# Patient Record
Sex: FEMALE | Race: ASIAN | NOT HISPANIC OR LATINO | Employment: PART TIME | URBAN - METROPOLITAN AREA
[De-identification: names, ages, dates, MRNs, and addresses within clinical notes are randomized per-mention and may not be internally consistent; named-entity substitution may affect disease eponyms.]

---

## 2017-03-28 ENCOUNTER — OFFICE VISIT - HEALTHEAST (OUTPATIENT)
Dept: FAMILY MEDICINE | Facility: CLINIC | Age: 32
End: 2017-03-28

## 2017-03-28 DIAGNOSIS — J02.9 SORE THROAT: ICD-10-CM

## 2017-03-28 DIAGNOSIS — J02.0 STREP PHARYNGITIS: ICD-10-CM

## 2017-04-21 ENCOUNTER — HOSPITAL ENCOUNTER (OUTPATIENT)
Dept: LAB | Age: 32
Setting detail: SPECIMEN
Discharge: HOME | End: 2017-04-21

## 2017-04-21 ENCOUNTER — OFFICE VISIT - HEALTHEAST (OUTPATIENT)
Dept: FAMILY MEDICINE | Facility: CLINIC | Age: 32
End: 2017-04-21

## 2017-04-21 DIAGNOSIS — Z30.9 CONTRACEPTION MANAGEMENT: ICD-10-CM

## 2017-04-21 DIAGNOSIS — N92.6 ABNORMAL MENSES: ICD-10-CM

## 2017-04-21 DIAGNOSIS — J02.9 SORE THROAT: ICD-10-CM

## 2017-05-15 ENCOUNTER — OFFICE VISIT - HEALTHEAST (OUTPATIENT)
Dept: FAMILY MEDICINE | Facility: CLINIC | Age: 32
End: 2017-05-15

## 2017-05-15 DIAGNOSIS — Z30.9 CONTRACEPTION MANAGEMENT: ICD-10-CM

## 2017-05-18 ENCOUNTER — COMMUNICATION - HEALTHEAST (OUTPATIENT)
Dept: FAMILY MEDICINE | Facility: CLINIC | Age: 32
End: 2017-05-18

## 2017-05-26 ENCOUNTER — RECORDS - HEALTHEAST (OUTPATIENT)
Dept: ADMINISTRATIVE | Facility: OTHER | Age: 32
End: 2017-05-26

## 2017-07-17 ENCOUNTER — OFFICE VISIT - HEALTHEAST (OUTPATIENT)
Dept: FAMILY MEDICINE | Facility: CLINIC | Age: 32
End: 2017-07-17

## 2017-07-17 DIAGNOSIS — Z30.9 CONTRACEPTION MANAGEMENT: ICD-10-CM

## 2017-07-17 DIAGNOSIS — E55.9 VITAMIN D DEFICIENCY: ICD-10-CM

## 2017-07-17 DIAGNOSIS — Z83.2 FAMILY HISTORY OF CLOTTING DISORDER: ICD-10-CM

## 2017-07-17 DIAGNOSIS — D50.8 IRON DEFICIENCY ANEMIA SECONDARY TO INADEQUATE DIETARY IRON INTAKE: ICD-10-CM

## 2017-07-17 ASSESSMENT — MIFFLIN-ST. JEOR: SCORE: 1186.23

## 2017-07-19 LAB — DRVVT, LUPUS ANTICOAGULANT - HISTORICAL: 52 SEC

## 2017-07-21 ENCOUNTER — COMMUNICATION - HEALTHEAST (OUTPATIENT)
Dept: FAMILY MEDICINE | Facility: CLINIC | Age: 32
End: 2017-07-21

## 2017-09-19 ENCOUNTER — OFFICE VISIT - HEALTHEAST (OUTPATIENT)
Dept: FAMILY MEDICINE | Facility: CLINIC | Age: 32
End: 2017-09-19

## 2017-09-19 DIAGNOSIS — R76.0 LUPUS ANTICOAGULANT POSITIVE: ICD-10-CM

## 2017-09-19 DIAGNOSIS — Z30.09 ENCOUNTER FOR COUNSELING REGARDING CONTRACEPTION: ICD-10-CM

## 2017-09-19 ASSESSMENT — MIFFLIN-ST. JEOR: SCORE: 1172.63

## 2017-09-26 ENCOUNTER — OFFICE VISIT - HEALTHEAST (OUTPATIENT)
Dept: FAMILY MEDICINE | Facility: CLINIC | Age: 32
End: 2017-09-26

## 2017-09-26 DIAGNOSIS — Z30.430 ENCOUNTER FOR IUD INSERTION: ICD-10-CM

## 2017-09-26 ASSESSMENT — MIFFLIN-ST. JEOR: SCORE: 1177.16

## 2017-09-28 ENCOUNTER — OFFICE VISIT - HEALTHEAST (OUTPATIENT)
Dept: FAMILY MEDICINE | Facility: CLINIC | Age: 32
End: 2017-09-28

## 2017-09-28 DIAGNOSIS — Z30.9 CONTRACEPTION MANAGEMENT: ICD-10-CM

## 2017-09-28 ASSESSMENT — MIFFLIN-ST. JEOR: SCORE: 1172.63

## 2017-09-29 LAB
HPV INTERPRETATION - HISTORICAL: NORMAL
HPV INTERPRETER - HISTORICAL: NORMAL

## 2017-10-12 ENCOUNTER — RECORDS - HEALTHEAST (OUTPATIENT)
Dept: ADMINISTRATIVE | Facility: OTHER | Age: 32
End: 2017-10-12

## 2017-10-17 ENCOUNTER — AMBULATORY - HEALTHEAST (OUTPATIENT)
Dept: FAMILY MEDICINE | Facility: CLINIC | Age: 32
End: 2017-10-17

## 2017-10-17 ENCOUNTER — AMBULATORY - HEALTHEAST (OUTPATIENT)
Dept: LAB | Facility: CLINIC | Age: 32
End: 2017-10-17

## 2017-10-17 DIAGNOSIS — Z11.1 SCREENING-PULMONARY TB: ICD-10-CM

## 2017-10-19 ENCOUNTER — COMMUNICATION - HEALTHEAST (OUTPATIENT)
Dept: FAMILY MEDICINE | Facility: CLINIC | Age: 32
End: 2017-10-19

## 2017-12-26 ENCOUNTER — COMMUNICATION - HEALTHEAST (OUTPATIENT)
Dept: SCHEDULING | Facility: CLINIC | Age: 32
End: 2017-12-26

## 2017-12-26 ENCOUNTER — OFFICE VISIT - HEALTHEAST (OUTPATIENT)
Dept: FAMILY MEDICINE | Facility: CLINIC | Age: 32
End: 2017-12-26

## 2017-12-26 DIAGNOSIS — D64.9 SEVERE ANEMIA: ICD-10-CM

## 2017-12-26 DIAGNOSIS — R76.0 LUPUS ANTICOAGULANT POSITIVE: ICD-10-CM

## 2017-12-26 DIAGNOSIS — Z92.0 HISTORY OF USE OF CONTRACEPTIVE INTRAUTERINE DEVICE (IUD): ICD-10-CM

## 2017-12-26 DIAGNOSIS — N92.0 MENORRHAGIA WITH REGULAR CYCLE: ICD-10-CM

## 2017-12-27 ENCOUNTER — OFFICE VISIT - HEALTHEAST (OUTPATIENT)
Dept: FAMILY MEDICINE | Facility: CLINIC | Age: 32
End: 2017-12-27

## 2017-12-27 DIAGNOSIS — R76.0 LUPUS ANTICOAGULANT POSITIVE: ICD-10-CM

## 2017-12-27 DIAGNOSIS — D64.9 ANEMIA: ICD-10-CM

## 2017-12-27 DIAGNOSIS — N92.0 MENORRHAGIA WITH REGULAR CYCLE: ICD-10-CM

## 2018-01-22 ENCOUNTER — COMMUNICATION - HEALTHEAST (OUTPATIENT)
Dept: FAMILY MEDICINE | Facility: CLINIC | Age: 33
End: 2018-01-22

## 2018-01-22 ENCOUNTER — OFFICE VISIT - HEALTHEAST (OUTPATIENT)
Dept: FAMILY MEDICINE | Facility: CLINIC | Age: 33
End: 2018-01-22

## 2018-01-22 DIAGNOSIS — N92.0 MENORRHAGIA WITH REGULAR CYCLE: ICD-10-CM

## 2018-01-22 DIAGNOSIS — D50.8 IRON DEFICIENCY ANEMIA SECONDARY TO INADEQUATE DIETARY IRON INTAKE: ICD-10-CM

## 2018-01-22 DIAGNOSIS — R76.0 LUPUS ANTICOAGULANT POSITIVE: ICD-10-CM

## 2018-02-07 ENCOUNTER — RECORDS - HEALTHEAST (OUTPATIENT)
Dept: ADMINISTRATIVE | Facility: OTHER | Age: 33
End: 2018-02-07

## 2018-02-08 ENCOUNTER — RECORDS - HEALTHEAST (OUTPATIENT)
Dept: ADMINISTRATIVE | Facility: OTHER | Age: 33
End: 2018-02-08

## 2018-02-27 ENCOUNTER — OFFICE VISIT - HEALTHEAST (OUTPATIENT)
Dept: FAMILY MEDICINE | Facility: CLINIC | Age: 33
End: 2018-02-27

## 2018-02-27 ENCOUNTER — AMBULATORY - HEALTHEAST (OUTPATIENT)
Dept: NURSING | Facility: CLINIC | Age: 33
End: 2018-02-27

## 2018-02-27 DIAGNOSIS — D50.9 IRON DEFICIENCY ANEMIA: ICD-10-CM

## 2018-02-27 DIAGNOSIS — N92.0 MENORRHAGIA WITH REGULAR CYCLE: ICD-10-CM

## 2018-02-27 DIAGNOSIS — Z30.2 REQUEST FOR STERILIZATION: ICD-10-CM

## 2018-02-27 DIAGNOSIS — R76.0 LUPUS ANTICOAGULANT POSITIVE: ICD-10-CM

## 2018-02-27 DIAGNOSIS — Z01.818 ENCOUNTER FOR PREOPERATIVE EXAMINATION FOR GENERAL SURGICAL PROCEDURE: ICD-10-CM

## 2018-02-27 LAB
ERYTHROCYTE [DISTWIDTH] IN BLOOD BY AUTOMATED COUNT: 12 % (ref 11–14.5)
HCT VFR BLD AUTO: 37.4 % (ref 35–47)
HGB BLD-MCNC: 12.3 G/DL (ref 12–16)
IRON SATN MFR SERPL: 18 % (ref 20–50)
IRON SERPL-MCNC: 71 UG/DL (ref 42–175)
MCH RBC QN AUTO: 28.6 PG (ref 27–34)
MCHC RBC AUTO-ENTMCNC: 32.9 G/DL (ref 32–36)
MCV RBC AUTO: 87 FL (ref 80–100)
PLATELET # BLD AUTO: 290 THOU/UL (ref 140–440)
PMV BLD AUTO: 6.8 FL (ref 7–10)
RBC # BLD AUTO: 4.31 MILL/UL (ref 3.8–5.4)
TIBC SERPL-MCNC: 398 UG/DL (ref 313–563)
TRANSFERRIN SERPL-MCNC: 319 MG/DL (ref 212–360)
WBC: 6.3 THOU/UL (ref 4–11)

## 2018-02-27 ASSESSMENT — MIFFLIN-ST. JEOR: SCORE: 1189.63

## 2018-02-28 ENCOUNTER — COMMUNICATION - HEALTHEAST (OUTPATIENT)
Dept: FAMILY MEDICINE | Facility: CLINIC | Age: 33
End: 2018-02-28

## 2018-03-07 ENCOUNTER — ANESTHESIA - HEALTHEAST (OUTPATIENT)
Dept: SURGERY | Facility: AMBULATORY SURGERY CENTER | Age: 33
End: 2018-03-07

## 2018-03-08 ENCOUNTER — RECORDS - HEALTHEAST (OUTPATIENT)
Dept: ADMINISTRATIVE | Facility: OTHER | Age: 33
End: 2018-03-08

## 2018-03-30 ENCOUNTER — AMBULATORY - HEALTHEAST (OUTPATIENT)
Dept: NURSING | Facility: CLINIC | Age: 33
End: 2018-03-30

## 2018-04-04 ENCOUNTER — PRENATAL OFFICE VISIT - HEALTHEAST (OUTPATIENT)
Dept: FAMILY MEDICINE | Facility: CLINIC | Age: 33
End: 2018-04-04

## 2018-04-04 DIAGNOSIS — Z34.90 NORMAL PREGNANCY: ICD-10-CM

## 2018-04-04 DIAGNOSIS — D50.9 IRON DEFICIENCY ANEMIA: ICD-10-CM

## 2018-04-04 DIAGNOSIS — N92.6 IRREGULAR MENSTRUAL CYCLE: ICD-10-CM

## 2018-04-04 DIAGNOSIS — Z32.01 POSITIVE PREGNANCY TEST: ICD-10-CM

## 2018-04-04 DIAGNOSIS — R76.0 LUPUS ANTICOAGULANT POSITIVE: ICD-10-CM

## 2018-04-04 LAB
ALBUMIN UR-MCNC: ABNORMAL MG/DL
AMPHETAMINES UR QL SCN: NORMAL
BARBITURATES UR QL: NORMAL
BENZODIAZ UR QL: NORMAL
CANNABINOIDS UR QL SCN: NORMAL
COCAINE UR QL: NORMAL
CREAT UR-MCNC: 241.7 MG/DL
GLUCOSE UR STRIP-MCNC: NEGATIVE MG/DL
HCG UR QL: POSITIVE
HIV 1+2 AB+HIV1 P24 AG SERPL QL IA: NEGATIVE
KETONES UR STRIP-MCNC: ABNORMAL MG/DL
OPIATES UR QL SCN: NORMAL
OXYCODONE UR QL: NORMAL
PCP UR QL SCN: NORMAL

## 2018-04-05 LAB
ABO/RH(D): NORMAL
ABORH REPEAT: NORMAL
ANTIBODY SCREEN: NEGATIVE
BACTERIA SPEC CULT: NO GROWTH
COLLECTION METHOD: NORMAL
HBV SURFACE AG SERPL QL IA: NEGATIVE
LEAD BLD-MCNC: NORMAL UG/DL
LEAD RETEST: NO
RUBV IGG SERPL QL IA: NEGATIVE
T PALLIDUM AB SER QL: NEGATIVE

## 2018-04-09 ENCOUNTER — HOSPITAL ENCOUNTER (OUTPATIENT)
Dept: ULTRASOUND IMAGING | Facility: HOSPITAL | Age: 33
Discharge: HOME | End: 2018-04-09
Attending: PHYSICIAN ASSISTANT

## 2018-04-09 DIAGNOSIS — Z34.90 NORMAL PREGNANCY: ICD-10-CM

## 2018-04-09 DIAGNOSIS — Z32.01 POSITIVE PREGNANCY TEST: ICD-10-CM

## 2018-04-26 ENCOUNTER — PRENATAL OFFICE VISIT - HEALTHEAST (OUTPATIENT)
Dept: FAMILY MEDICINE | Facility: CLINIC | Age: 33
End: 2018-04-26

## 2018-04-26 DIAGNOSIS — Z82.49 FAMILY HISTORY OF BLOOD CLOTS: ICD-10-CM

## 2018-04-26 DIAGNOSIS — Z75.8 LANGUAGE BARRIER: ICD-10-CM

## 2018-04-26 DIAGNOSIS — K03.6 BETEL DEPOSIT ON TEETH: ICD-10-CM

## 2018-04-26 DIAGNOSIS — Z60.3 LANGUAGE BARRIER: ICD-10-CM

## 2018-04-26 DIAGNOSIS — R76.0 LUPUS ANTICOAGULANT POSITIVE: ICD-10-CM

## 2018-04-26 DIAGNOSIS — Z34.81 ENCOUNTER FOR SUPERVISION OF OTHER NORMAL PREGNANCY IN FIRST TRIMESTER: ICD-10-CM

## 2018-04-26 LAB
ALBUMIN UR-MCNC: ABNORMAL MG/DL
GLUCOSE UR STRIP-MCNC: NEGATIVE MG/DL
KETONES UR STRIP-MCNC: ABNORMAL MG/DL

## 2018-04-26 SDOH — SOCIAL STABILITY - SOCIAL INSECURITY: ACCULTURATION DIFFICULTY: Z60.3

## 2018-04-27 LAB
C TRACH DNA SPEC QL PROBE+SIG AMP: NEGATIVE
N GONORRHOEA DNA SPEC QL NAA+PROBE: NEGATIVE

## 2018-05-11 ENCOUNTER — COMMUNICATION - HEALTHEAST (OUTPATIENT)
Dept: FAMILY MEDICINE | Facility: CLINIC | Age: 33
End: 2018-05-11

## 2018-05-16 ENCOUNTER — COMMUNICATION - HEALTHEAST (OUTPATIENT)
Dept: FAMILY MEDICINE | Facility: CLINIC | Age: 33
End: 2018-05-16

## 2018-05-21 ENCOUNTER — PRENATAL OFFICE VISIT - HEALTHEAST (OUTPATIENT)
Dept: FAMILY MEDICINE | Facility: CLINIC | Age: 33
End: 2018-05-21

## 2018-05-21 DIAGNOSIS — Z34.82 ENCOUNTER FOR SUPERVISION OF OTHER NORMAL PREGNANCY IN SECOND TRIMESTER: ICD-10-CM

## 2018-05-21 LAB
ALBUMIN UR-MCNC: NEGATIVE MG/DL
GLUCOSE UR STRIP-MCNC: NEGATIVE MG/DL
KETONES UR STRIP-MCNC: NEGATIVE MG/DL

## 2018-06-25 ENCOUNTER — PRENATAL OFFICE VISIT - HEALTHEAST (OUTPATIENT)
Dept: FAMILY MEDICINE | Facility: CLINIC | Age: 33
End: 2018-06-25

## 2018-06-25 DIAGNOSIS — Z34.82 ENCOUNTER FOR SUPERVISION OF OTHER NORMAL PREGNANCY IN SECOND TRIMESTER: ICD-10-CM

## 2018-06-27 LAB
AFP MOM: 1.1 MOM
ALPHA FETO PROTEIN: 63.1 NG/ML
CALCULATED AGE AT EDD: NORMAL
COLLECTION DATE: NORMAL
CURRENT CIGARETTE SMOKING STATUS: NORMAL
DOWN SYNDROME MATERNAL AGE RISK: NORMAL
DOWN SYNDROME SCREEN RISK ESTIMATE: NORMAL
EDD BY U/S SCAN: NORMAL
GA ON COLLECTION BY U/S SCAN: NORMAL WK,D
GA USED IN RISK ESTIMATE: NORMAL
GENERAL TEST INFORMATION: NORMAL
HCG, TOTAL MOM: 0.57 MOM
HCG, TOTAL: 12.1 IU/ML
INHIBIN MOM: 0.62 MOM
INHIBIN: 113 PG/ML
INITIAL OR REPEAT TESTING: NORMAL
INSULIN DIABETIC: NO
INTERPRETATION: NORMAL
IVF PREGNANCY: NO
Lab: NORMAL
NEURAL TUBE DEFECT RISK ESTIMATE: NORMAL
NUMBER OF CHORIONS: NORMAL
NUMBER OF FETUSES:: 1
PATIENT OR FATHER OF BABY HAS A NTD: NO
PATIENT WEIGHT: 139 LBS
PHYSICIAN PHONE NUMBER: NORMAL
PREV DOWN (T21) / TRISOMY PREGNANCY: NORMAL
PREV PREGNANCY W/ NEURAL TUBE DEFECT: NO
PT DATE OF BIRTH: NORMAL
RACE OF MOTHER: NORMAL
RECOMMENDED FOLLOW UP: NORMAL
TRISOMY 18 SCREEN RISK ESTIMATE: NORMAL
UE3 MOM: 1.74 MOM
UE3: 3.02 NG/ML

## 2018-06-29 ENCOUNTER — COMMUNICATION - HEALTHEAST (OUTPATIENT)
Dept: FAMILY MEDICINE | Facility: CLINIC | Age: 33
End: 2018-06-29

## 2018-07-02 ENCOUNTER — HOSPITAL ENCOUNTER (OUTPATIENT)
Dept: ULTRASOUND IMAGING | Facility: HOSPITAL | Age: 33
Discharge: HOME | End: 2018-07-02
Attending: FAMILY MEDICINE

## 2018-07-02 DIAGNOSIS — Z34.82 ENCOUNTER FOR SUPERVISION OF OTHER NORMAL PREGNANCY IN SECOND TRIMESTER: ICD-10-CM

## 2018-07-31 ENCOUNTER — PRENATAL OFFICE VISIT - HEALTHEAST (OUTPATIENT)
Dept: FAMILY MEDICINE | Facility: CLINIC | Age: 33
End: 2018-07-31

## 2018-07-31 DIAGNOSIS — Z34.92 NORMAL PREGNANCY IN SECOND TRIMESTER: ICD-10-CM

## 2018-07-31 DIAGNOSIS — Z34.82 ENCOUNTER FOR SUPERVISION OF OTHER NORMAL PREGNANCY IN SECOND TRIMESTER: ICD-10-CM

## 2018-08-06 ENCOUNTER — PRENATAL OFFICE VISIT - HEALTHEAST (OUTPATIENT)
Dept: FAMILY MEDICINE | Facility: CLINIC | Age: 33
End: 2018-08-06

## 2018-08-06 DIAGNOSIS — Z34.82 ENCOUNTER FOR SUPERVISION OF OTHER NORMAL PREGNANCY IN SECOND TRIMESTER: ICD-10-CM

## 2018-08-06 LAB
ALBUMIN UR-MCNC: NEGATIVE MG/DL
FASTING STATUS PATIENT QL REPORTED: YES
GLUCOSE 1H P 50 G GLC PO SERPL-MCNC: 127 MG/DL (ref 70–139)
GLUCOSE UR STRIP-MCNC: ABNORMAL MG/DL
HGB BLD-MCNC: 10.9 G/DL (ref 12–16)
KETONES UR STRIP-MCNC: NEGATIVE MG/DL

## 2018-08-07 LAB — T PALLIDUM AB SER QL: NEGATIVE

## 2018-08-31 ENCOUNTER — PRENATAL OFFICE VISIT - HEALTHEAST (OUTPATIENT)
Dept: FAMILY MEDICINE | Facility: CLINIC | Age: 33
End: 2018-08-31

## 2018-08-31 DIAGNOSIS — Z34.83 ENCOUNTER FOR SUPERVISION OF OTHER NORMAL PREGNANCY IN THIRD TRIMESTER: ICD-10-CM

## 2018-09-13 ENCOUNTER — PRENATAL OFFICE VISIT - HEALTHEAST (OUTPATIENT)
Dept: FAMILY MEDICINE | Facility: CLINIC | Age: 33
End: 2018-09-13

## 2018-09-13 DIAGNOSIS — Z34.93 NORMAL PREGNANCY IN THIRD TRIMESTER: ICD-10-CM

## 2018-09-13 DIAGNOSIS — Z34.83 ENCOUNTER FOR SUPERVISION OF OTHER NORMAL PREGNANCY IN THIRD TRIMESTER: ICD-10-CM

## 2018-09-24 ENCOUNTER — PRENATAL OFFICE VISIT - HEALTHEAST (OUTPATIENT)
Dept: FAMILY MEDICINE | Facility: CLINIC | Age: 33
End: 2018-09-24

## 2018-09-24 DIAGNOSIS — Z59.9 FINANCIAL DIFFICULTIES: ICD-10-CM

## 2018-09-24 DIAGNOSIS — Z34.93 NORMAL PREGNANCY IN THIRD TRIMESTER: ICD-10-CM

## 2018-09-24 DIAGNOSIS — Z34.83 ENCOUNTER FOR SUPERVISION OF OTHER NORMAL PREGNANCY IN THIRD TRIMESTER: ICD-10-CM

## 2018-09-24 SDOH — ECONOMIC STABILITY - INCOME SECURITY: PROBLEM RELATED TO HOUSING AND ECONOMIC CIRCUMSTANCES, UNSPECIFIED: Z59.9

## 2018-09-28 ENCOUNTER — COMMUNICATION - HEALTHEAST (OUTPATIENT)
Dept: NURSING | Facility: CLINIC | Age: 33
End: 2018-09-28

## 2018-10-08 ENCOUNTER — PRENATAL OFFICE VISIT - HEALTHEAST (OUTPATIENT)
Dept: FAMILY MEDICINE | Facility: CLINIC | Age: 33
End: 2018-10-08

## 2018-10-08 DIAGNOSIS — Z34.83 ENCOUNTER FOR SUPERVISION OF OTHER NORMAL PREGNANCY IN THIRD TRIMESTER: ICD-10-CM

## 2018-10-08 LAB
ALBUMIN UR-MCNC: NEGATIVE MG/DL
GLUCOSE UR STRIP-MCNC: ABNORMAL MG/DL
KETONES UR STRIP-MCNC: NEGATIVE MG/DL

## 2018-10-11 ENCOUNTER — COMMUNICATION - HEALTHEAST (OUTPATIENT)
Dept: NURSING | Facility: CLINIC | Age: 33
End: 2018-10-11

## 2018-10-16 ENCOUNTER — AMBULATORY - HEALTHEAST (OUTPATIENT)
Dept: NURSING | Facility: CLINIC | Age: 33
End: 2018-10-16

## 2018-10-16 ENCOUNTER — COMMUNICATION - HEALTHEAST (OUTPATIENT)
Dept: NURSING | Facility: CLINIC | Age: 33
End: 2018-10-16

## 2018-10-17 ENCOUNTER — COMMUNICATION - HEALTHEAST (OUTPATIENT)
Dept: NURSING | Facility: CLINIC | Age: 33
End: 2018-10-17

## 2018-10-25 ENCOUNTER — PRENATAL OFFICE VISIT - HEALTHEAST (OUTPATIENT)
Dept: FAMILY MEDICINE | Facility: CLINIC | Age: 33
End: 2018-10-25

## 2018-10-25 DIAGNOSIS — Z34.83 ENCOUNTER FOR SUPERVISION OF OTHER NORMAL PREGNANCY IN THIRD TRIMESTER: ICD-10-CM

## 2018-10-25 DIAGNOSIS — Z34.82 ENCOUNTER FOR SUPERVISION OF OTHER NORMAL PREGNANCY IN SECOND TRIMESTER: ICD-10-CM

## 2018-10-26 LAB
ALLERGIC TO PENICILLIN: NO
GP B STREP DNA SPEC QL NAA+PROBE: NEGATIVE

## 2018-10-29 ENCOUNTER — PRENATAL OFFICE VISIT - HEALTHEAST (OUTPATIENT)
Dept: FAMILY MEDICINE | Facility: CLINIC | Age: 33
End: 2018-10-29

## 2018-10-29 DIAGNOSIS — Z34.81 ENCOUNTER FOR SUPERVISION OF OTHER NORMAL PREGNANCY IN FIRST TRIMESTER: ICD-10-CM

## 2018-10-29 DIAGNOSIS — D50.9 IRON DEFICIENCY ANEMIA: ICD-10-CM

## 2018-10-29 DIAGNOSIS — Z34.83 ENCOUNTER FOR SUPERVISION OF OTHER NORMAL PREGNANCY IN THIRD TRIMESTER: ICD-10-CM

## 2018-10-29 DIAGNOSIS — R76.0 LUPUS ANTICOAGULANT POSITIVE: ICD-10-CM

## 2018-10-29 DIAGNOSIS — Z34.82 ENCOUNTER FOR SUPERVISION OF OTHER NORMAL PREGNANCY IN SECOND TRIMESTER: ICD-10-CM

## 2018-10-29 ASSESSMENT — MIFFLIN-ST. JEOR: SCORE: 1302.22

## 2018-11-05 ENCOUNTER — PRENATAL OFFICE VISIT - HEALTHEAST (OUTPATIENT)
Dept: FAMILY MEDICINE | Facility: CLINIC | Age: 33
End: 2018-11-05

## 2018-11-05 DIAGNOSIS — Z34.83 ENCOUNTER FOR SUPERVISION OF OTHER NORMAL PREGNANCY IN THIRD TRIMESTER: ICD-10-CM

## 2018-11-12 ENCOUNTER — PRENATAL OFFICE VISIT - HEALTHEAST (OUTPATIENT)
Dept: FAMILY MEDICINE | Facility: CLINIC | Age: 33
End: 2018-11-12

## 2018-11-12 DIAGNOSIS — Z34.83 ENCOUNTER FOR SUPERVISION OF OTHER NORMAL PREGNANCY IN THIRD TRIMESTER: ICD-10-CM

## 2018-11-16 ENCOUNTER — COMMUNICATION - HEALTHEAST (OUTPATIENT)
Dept: FAMILY MEDICINE | Facility: CLINIC | Age: 33
End: 2018-11-16

## 2018-11-17 ASSESSMENT — MIFFLIN-ST. JEOR: SCORE: 1324.92

## 2018-11-18 ENCOUNTER — ANESTHESIA - HEALTHEAST (OUTPATIENT)
Dept: SURGERY | Facility: HOSPITAL | Age: 33
End: 2018-11-18

## 2018-11-18 ENCOUNTER — SURGERY - HEALTHEAST (OUTPATIENT)
Dept: SURGERY | Facility: HOSPITAL | Age: 33
End: 2018-11-18

## 2018-11-19 ENCOUNTER — HOME CARE/HOSPICE - HEALTHEAST (OUTPATIENT)
Dept: HOME HEALTH SERVICES | Facility: HOME HEALTH | Age: 33
End: 2018-11-19

## 2018-11-20 ENCOUNTER — HOME CARE/HOSPICE - HEALTHEAST (OUTPATIENT)
Dept: HOME HEALTH SERVICES | Facility: HOME HEALTH | Age: 33
End: 2018-11-20

## 2018-11-26 ENCOUNTER — PRENATAL OFFICE VISIT - HEALTHEAST (OUTPATIENT)
Dept: FAMILY MEDICINE | Facility: CLINIC | Age: 33
End: 2018-11-26

## 2018-11-26 DIAGNOSIS — D64.9 ANEMIA, UNSPECIFIED TYPE: ICD-10-CM

## 2018-11-26 DIAGNOSIS — Z28.39 RUBELLA NON-IMMUNE STATUS, ANTEPARTUM: ICD-10-CM

## 2018-11-26 DIAGNOSIS — Z75.8 LANGUAGE BARRIER: ICD-10-CM

## 2018-11-26 DIAGNOSIS — O09.899 RUBELLA NON-IMMUNE STATUS, ANTEPARTUM: ICD-10-CM

## 2018-11-26 DIAGNOSIS — Z60.3 LANGUAGE BARRIER: ICD-10-CM

## 2018-11-26 DIAGNOSIS — Z98.890 POSTOPERATIVE STATE: ICD-10-CM

## 2018-11-26 DIAGNOSIS — E55.9 VITAMIN D DEFICIENCY: ICD-10-CM

## 2018-11-26 SDOH — SOCIAL STABILITY - SOCIAL INSECURITY: ACCULTURATION DIFFICULTY: Z60.3

## 2018-12-20 ENCOUNTER — COMMUNICATION - HEALTHEAST (OUTPATIENT)
Dept: FAMILY MEDICINE | Facility: CLINIC | Age: 33
End: 2018-12-20

## 2018-12-28 ENCOUNTER — COMMUNICATION - HEALTHEAST (OUTPATIENT)
Dept: FAMILY MEDICINE | Facility: CLINIC | Age: 33
End: 2018-12-28

## 2019-01-21 ENCOUNTER — OFFICE VISIT - HEALTHEAST (OUTPATIENT)
Dept: FAMILY MEDICINE | Facility: CLINIC | Age: 34
End: 2019-01-21

## 2019-01-21 DIAGNOSIS — E55.9 VITAMIN D DEFICIENCY: ICD-10-CM

## 2019-01-21 DIAGNOSIS — D50.9 IRON DEFICIENCY ANEMIA: ICD-10-CM

## 2019-01-21 RX ORDER — FERROUS SULFATE 325(65) MG
1 TABLET ORAL
Qty: 100 TABLET | Refills: 1 | Status: SHIPPED | OUTPATIENT
Start: 2019-01-21

## 2019-01-30 ENCOUNTER — COMMUNICATION - HEALTHEAST (OUTPATIENT)
Dept: SCHEDULING | Facility: CLINIC | Age: 34
End: 2019-01-30

## 2019-01-31 ENCOUNTER — OFFICE VISIT - HEALTHEAST (OUTPATIENT)
Dept: FAMILY MEDICINE | Facility: CLINIC | Age: 34
End: 2019-01-31

## 2019-01-31 DIAGNOSIS — L50.9 URTICARIA: ICD-10-CM

## 2019-01-31 RX ORDER — LORATADINE 10 MG/1
10 TABLET ORAL DAILY
Qty: 30 TABLET | Refills: 11 | Status: SHIPPED | OUTPATIENT
Start: 2019-01-31

## 2019-01-31 ASSESSMENT — MIFFLIN-ST. JEOR: SCORE: 1172.63

## 2019-11-12 ENCOUNTER — OFFICE VISIT - HEALTHEAST (OUTPATIENT)
Dept: FAMILY MEDICINE | Facility: CLINIC | Age: 34
End: 2019-11-12

## 2019-11-12 DIAGNOSIS — Z71.84 TRAVEL ADVICE ENCOUNTER: ICD-10-CM

## 2019-11-12 RX ORDER — BISMUTH SUBSALICYLATE 262 MG/1
2 TABLET, CHEWABLE ORAL 4 TIMES DAILY
Qty: 100 TABLET | Refills: 0 | Status: SHIPPED | OUTPATIENT
Start: 2019-11-12 | End: 2024-02-22

## 2019-11-12 RX ORDER — AZITHROMYCIN 500 MG/1
1000 TABLET, FILM COATED ORAL
Qty: 6 TABLET | Refills: 0 | Status: SHIPPED | OUTPATIENT
Start: 2019-11-12 | End: 2024-02-22

## 2019-11-12 ASSESSMENT — MIFFLIN-ST. JEOR: SCORE: 1163.55

## 2020-02-04 ENCOUNTER — OFFICE VISIT - HEALTHEAST (OUTPATIENT)
Dept: FAMILY MEDICINE | Facility: CLINIC | Age: 35
End: 2020-02-04

## 2020-02-04 DIAGNOSIS — K12.2 ORAL ABSCESS: ICD-10-CM

## 2020-02-04 ASSESSMENT — MIFFLIN-ST. JEOR: SCORE: 1131.8

## 2021-05-30 VITALS — BODY MASS INDEX: 25.45 KG/M2 | WEIGHT: 126 LBS

## 2021-05-30 VITALS — WEIGHT: 127.12 LBS | BODY MASS INDEX: 25.68 KG/M2

## 2021-05-30 VITALS — WEIGHT: 126 LBS | BODY MASS INDEX: 25.45 KG/M2

## 2021-05-31 VITALS — BODY MASS INDEX: 25.41 KG/M2 | WEIGHT: 125.8 LBS

## 2021-05-31 VITALS — HEIGHT: 59 IN | BODY MASS INDEX: 25.4 KG/M2 | WEIGHT: 126 LBS

## 2021-05-31 VITALS — BODY MASS INDEX: 25.2 KG/M2 | HEIGHT: 59 IN | WEIGHT: 125 LBS

## 2021-05-31 VITALS — WEIGHT: 125 LBS | BODY MASS INDEX: 25.2 KG/M2 | HEIGHT: 59 IN

## 2021-05-31 VITALS — HEIGHT: 60 IN | BODY MASS INDEX: 24.94 KG/M2 | WEIGHT: 127 LBS

## 2021-05-31 VITALS — BODY MASS INDEX: 25.37 KG/M2 | WEIGHT: 125.6 LBS

## 2021-05-31 VITALS — WEIGHT: 128 LBS | HEIGHT: 59 IN | BODY MASS INDEX: 25.8 KG/M2

## 2021-05-31 VITALS — BODY MASS INDEX: 25.65 KG/M2 | WEIGHT: 127 LBS

## 2021-06-01 VITALS — BODY MASS INDEX: 28.2 KG/M2 | WEIGHT: 142 LBS

## 2021-06-01 VITALS — BODY MASS INDEX: 29.79 KG/M2 | WEIGHT: 150 LBS

## 2021-06-01 VITALS — BODY MASS INDEX: 25.22 KG/M2 | WEIGHT: 127 LBS

## 2021-06-01 VITALS — WEIGHT: 137.25 LBS | BODY MASS INDEX: 27.26 KG/M2

## 2021-06-01 VITALS — BODY MASS INDEX: 28.8 KG/M2 | WEIGHT: 145 LBS

## 2021-06-01 VITALS — BODY MASS INDEX: 26.21 KG/M2 | WEIGHT: 132 LBS

## 2021-06-01 VITALS — WEIGHT: 139 LBS | BODY MASS INDEX: 27.6 KG/M2

## 2021-06-01 VITALS — BODY MASS INDEX: 28.4 KG/M2 | WEIGHT: 143 LBS

## 2021-06-01 VITALS — WEIGHT: 149 LBS | BODY MASS INDEX: 29.59 KG/M2

## 2021-06-02 VITALS — HEIGHT: 59 IN | WEIGHT: 125 LBS | BODY MASS INDEX: 25.2 KG/M2

## 2021-06-02 VITALS — WEIGHT: 127.25 LBS | BODY MASS INDEX: 25.31 KG/M2

## 2021-06-02 VITALS — HEIGHT: 59 IN | WEIGHT: 152 LBS | BODY MASS INDEX: 30.64 KG/M2

## 2021-06-02 VITALS — BODY MASS INDEX: 30.44 KG/M2 | WEIGHT: 153 LBS

## 2021-06-02 VITALS — BODY MASS INDEX: 31.65 KG/M2 | WEIGHT: 157 LBS | HEIGHT: 59 IN

## 2021-06-02 VITALS — WEIGHT: 153 LBS | BODY MASS INDEX: 30.39 KG/M2

## 2021-06-02 VITALS — WEIGHT: 157 LBS | BODY MASS INDEX: 31.23 KG/M2

## 2021-06-03 VITALS
RESPIRATION RATE: 16 BRPM | BODY MASS INDEX: 24.8 KG/M2 | HEART RATE: 88 BPM | WEIGHT: 123 LBS | HEIGHT: 59 IN | DIASTOLIC BLOOD PRESSURE: 68 MMHG | TEMPERATURE: 97.3 F | SYSTOLIC BLOOD PRESSURE: 92 MMHG

## 2021-06-03 NOTE — PROGRESS NOTES
"Office Visit  [facility]  Date of Service: 11/12/2019      Subjective   Hla Jeanette Roman is a 34 y.o. female who presents for Travel Consult    [facility] [place]-1/15/20  Whole family.  Visiting dad's mom.  Will be in city of [facility], in [facility].    Patient has history of malaria disease.  Motivated to avoid recurrent malaria.    Objective   BP 92/68 (Patient Site: Right Arm, Patient Position: Sitting, Cuff Size: Adult Regular)   Pulse 88   Temp 97.3  F (36.3  C) (Oral)   Resp 16   Ht 4' 11\" (1.499 m)   Wt 123 lb (55.8 kg)   LMP 01/28/2018   BMI 24.84 kg/m     She reports that she has never smoked. She has never used smokeless tobacco.    Gen: Alert, no apparent distress.  Heart: Regular rate and rhythm, no murmurs.  Lungs: Clear to auscultation bilaterally, no increased work of breathing.  Abdomen: Soft, non-tender, non-distended, bowel sounds normal.  Extremities: No clubbing, cyanosis, edema.    Assessment & Plan     1. Travel advice encounter.  Planning travel to [facility].  Malaria prophylaxis given.  Discussed mosquito avoidance.  Traveler's diarrhea antibiotic: Azithromycin.  Discussed avoiding water, peeling, cooking foods.  Discussed traveler safety including seatbelts and motor vehicle safety.  Immunizations reviewed and updated with influenza and typhoid immunization.      Order Summary                                                      1. Travel advice encounter  mefloquine (LARIAM) 250 mg tablet    azithromycin (ZITHROMAX) 500 MG tablet    bismuth subsalicylate (BISMUTH SUBSALICYLATE) 262 mg Chew chew tab    Typhoid, Inactive, Inj      No future appointments.    Completed by: Eleanor Steinberg M.D., [facility]. 11/12/2019 4:42 PM.  This transcription uses voice recognition software, which may contain typographical errors.  "

## 2021-06-04 VITALS
HEART RATE: 91 BPM | RESPIRATION RATE: 18 BRPM | DIASTOLIC BLOOD PRESSURE: 68 MMHG | SYSTOLIC BLOOD PRESSURE: 100 MMHG | WEIGHT: 116 LBS | HEIGHT: 59 IN | TEMPERATURE: 98.5 F | BODY MASS INDEX: 23.39 KG/M2

## 2021-06-05 NOTE — PROGRESS NOTES
"Office Visit  [facility]  Date of Service: 02/04/2020      Subjective   Abhinav Roman is a 34 y.o. female who presents for pain on mouth    Abhinav Roman presents with a painful bump on the roof of her mouth.  It is been present for 5 days.  It has been growing in size and increasing in pain.  She has never had pain like this before.  No fevers.  No other symptoms elsewhere.  No sinus pain.  She feels like the pain is radiating into her tooth on the upper left anterior side.    Objective   /68 (Patient Site: Left Arm, Patient Position: Sitting, Cuff Size: Adult Regular)   Pulse 91   Temp 98.5  F (36.9  C) (Oral)   Resp 18   Ht 4' 11\" (1.499 m)   Wt 116 lb (52.6 kg)   LMP 01/28/2018   BMI 23.43 kg/m     She reports that she has never smoked. She has never used smokeless tobacco.    Gen: Alert, no apparent distress.  Mouth: Poor dentition with caries and cracked teeth.  On the anterior left hard palate, there is a 1.5 cm soft, skin-colored bump, which is tender.  No tenderness of the sinuses.  No visible erythema around the bases of the teeth.      Assessment & Plan     1. Oral abscess.  Treat with Augmentin 3 times daily, referral to ENT to discuss whether it needs draining.  Discussed with patient that if symptoms are getting worse, or failing to improve with that within 48 to 72 hours, she needs to be seen more urgently.    Order Summary                                                      1. Oral abscess  amoxicillin-clavulanate (AUGMENTIN) 500-125 mg per tablet    Ambulatory referral to ENT      Future Appointments   Date Time Provider Department Center   2/14/2020  8:30 AM Aniket Plasencia MD [facility]       Completed by: Eleanor Steinberg M.D., [facility]. 2/4/2020 2:52 PM.  This transcription uses voice recognition software, which may contain typographical errors.  "

## 2021-06-09 NOTE — PROGRESS NOTES
"Subjective:  31 y.o. female here with concerns of sore throat.  Has had fever, cough, and ST for three days.  Son sick as well.  Has headache and myaglia.  Has not taken any medicine.  No rash.    Objective:  /60 (Patient Site: Right Arm, Patient Position: Sitting, Cuff Size: Adult Regular)  Pulse (!) 113  Temp 99.2  F (37.3  C) (Oral)   Wt 126 lb (57.2 kg)  LMP 03/12/2017 (Exact Date)  SpO2 98%  Breastfeeding? No  BMI 25.45 kg/m2     GENERAL: alert, not distressed  EARS: tympanic membranes normal, external auditory canals normal  NOSE: no rhinorrhea, nasal mucosa normal  PHARYNX: has erythema and exudates on both tonsils.  MOUTH: well hydrated. Handling secretions well.  No \"hot potato\" voice.  FRONTAL and MAXILLARY SINUSES: non tender  NECK: mild lymphadenopathy, no nodules, or rigidity.  CHEST: clear, no rales, rhonchi, or wheezes  CARDIAC: regular without murmur    Assessment and Plan:  Strep pharyngitis  Diagnosis discussed and treatment options presented.  Patient desired IM PCN.  Will recommend NSAID for pain or fever.  Since son is sick with similar illness, he should be seen as well.  - penicillin G benzathine injection 1.2 Million Units (BICILLIN-LA); Inject 2 mL (1.2 Million Units total) into the shoulder, thigh, or buttocks once.  - ibuprofen (ADVIL,MOTRIN) 400 MG tablet; Take 1-2 tablets (400-800 mg total) by mouth every 6 (six) hours as needed for pain.  Dispense: 30 tablet; Refill: 1       "

## 2021-06-10 NOTE — PROGRESS NOTES
"  Subjective:    Abhinav Reid is a 32 y.o. female who presents for contraception management.  Most recently, I saw patient and we discussed NuvaRing, that was over one year ago.  Previously she had mentioned that IUD caused too much bleeding, Depo-Provera shot made her dizzy and gave her too much intermittent bleeding.  She did not want to try Nexplanon.  We discussed patches and NuvaRing.  At that visit, she wanted to try NuvaRing.  Today, she says that she did not like it.  She says NuvaRing caused irregular bleeding and too much \"fluid leaking.\"  She is interested in trying Ortho Evra patch.  She has never used it before.    Patient Active Problem List   Diagnosis     Vitamin D Deficiency     Seborrhea Capitis     Lightheadedness     Anemia       Current Outpatient Prescriptions:      ferrous sulfate 325 mg (65 mg iron) CpER, Take 1 tablet by mouth 3 (three) times a day., Disp: 100 capsule, Rfl: 2     norelgestromin-ethinyl estradiol (ORTHO EVRA) 150-35 mcg/24 hr, Use one patch on skin once a week for 3 weeks.  Then no patch for one week.  Then repeat., Disp: 9 patch, Rfl: 3    Objective:   Allergies:  Review of patient's allergies indicates no known allergies.    Vitals:    05/15/17 1110   BP: 112/70   Pulse: 76     Body mass index is 25.68 kg/(m^2).    General: Alert and oriented x 3, in no apparent distress       Assessment and Plan:   1.  Contraception management.  After discussion, patient would like Ortho Evra patch.  No history of high blood pressure, she is a non-smoker, no history of blood clots.  Risks, benefits, possible side effects of medication were discussed with patient.  We reviewed proper use.  She knows she needs to use backup birth control for the first 2 weeks of use.  We reviewed proper start time.  I asked her to follow-up with us if she would like to try something different.    This dictation uses voice recognition software, which may contain typographical errors.    "

## 2021-06-11 NOTE — PROGRESS NOTES
" Subjective    Abhinav Reid is a 32 y.o. female who presents for contraception counseling.    Depo-Provera caused excessive bleeding, and iron deficiency anemia.  ParaGard IUD caused excessive bleeding.  NuvaRing caused irregular bleeding.    She likes the birth control patch.  Discussed details of how to use it.  She acted a little bit surprised about this.    She has a history of using iron for iron deficiency anemia.  She is not currently on iron.  She has a history of vitamin D deficiency and is not currently on vitamin D.    Chart reviewed.  Her son, Tenzin, has a history of lupus anticoagulant positive.  The patient has never been tested for this.  Recommended testing for this thrombophilia because its presence would be a contraindication to hormonal contraception.     Objective    Blood pressure 90/70, pulse 84, resp. rate 18, height 4' 11\" (1.499 m), weight 128 lb (58.1 kg), last menstrual period 07/01/2017, not currently breastfeeding. Body mass index is 25.85 kg/(m^2). Patient reports that she has never smoked. She has never used smokeless tobacco.    Gen: Alert, no apparent distress.  Heart: Regular rate and rhythm, no murmurs.  Lungs: Clear to auscultation bilaterally, no increased work of breathing.  Abdomen: Soft, non-tender, non-distended, bowel sounds normal.  Extremities: No clubbing, cyanosis, edema.    Results for orders placed or performed in visit on 07/17/17   HM2(CBC w/o Differential)   Result Value Ref Range    WBC 5.5 4.0 - 11.0 thou/uL    RBC 4.30 3.80 - 5.40 mill/uL    Hemoglobin 12.0 12.0 - 16.0 g/dL    Hematocrit 35.9 35.0 - 47.0 %    MCV 83 80 - 100 fL    MCH 27.9 27.0 - 34.0 pg    MCHC 33.4 32.0 - 36.0 g/dL    RDW 11.8 11.0 - 14.5 %    Platelets 272 140 - 440 thou/uL    MPV 7.4 7.0 - 10.0 fL     No results found.       Assessment & Plan      Abhinav is a 32 y.o. female who is here today for Follow-up (birth control)      1. Conception counseling.  Ortho Evra refilled.  Due to son's history of " lupus anticoagulant, recommended screening for lupus coagulant.  This was done today.  Result is pending.  2. History of iron deficiency anemia.  CBC and iron studies done.  3. History of vitamin D deficiency.  Recommended ongoing vitamin D supplementation.  Currently not on a supplement.  Check baseline levels.    1. Contraception management - patch  norelgestromin-ethinyl estradiol (ORTHO EVRA) 150-35 mcg/24 hr   2. Iron deficiency anemia secondary to inadequate dietary iron intake  HM2(CBC w/o Differential)    Iron and Transferrin Iron Binding Capacity   3. Vitamin D deficiency  Vitamin D, Total (25-Hydroxy)   4. Family history of clotting disorder  Lupus Anticoagulant           This transcription uses voice recognition software, which may contain typographical errors.

## 2021-06-13 NOTE — PROGRESS NOTES
" Subjective    Abhinav Reid is a 32 y.o. female who presents for IUD insertion.    The patient was previously using oral contraceptive pills or birth control patch for contraception.  However, due to her child's history of lupus anticoagulant, she was tested for lupus anticoagulant and was found to be positive for it.  Estrogen-containing contraception contraindicated due to this finding.  Previously met with patient to discuss risks, benefits and alternatives.  She has elected to proceed with IUD.       Objective    Blood pressure 100/74, pulse 84, resp. rate 17, height 4' 11\" (1.499 m), weight 126 lb (57.2 kg), last menstrual period 09/14/2017, not currently breastfeeding. Body mass index is 25.45 kg/(m^2). Patient reports that she has never smoked. She has never used smokeless tobacco.    Gen: Alert, no apparent distress.  Heart: Regular rate and rhythm, no murmurs.  Lungs: Clear to auscultation bilaterally, no increased work of breathing.  Abdomen: Soft, non-tender, non-distended, bowel sounds normal.  Extremities: No clubbing, cyanosis, edema.    Results for orders placed or performed in visit on 09/26/17   Pregnancy (Beta-hCG, Qual), Urine   Result Value Ref Range    Pregnancy Test, Urine Negative Negative    Specific Gravity, UA 1.025 1.001 - 1.030     No results found.       Assessment & Plan      Abhinav is a 32 y.o. female who is here today for Contraception (insertion)      1. Attempted IUD insertion, unable to pass uterine sound beyond internal cause.  The patient will return to clinic in 2 days.  She will premedicate with misoprostol 400 mcg the night report for the procedure, and 400 mcg 2 hours prior to appointment time.    1. Encounter for IUD insertion  Pregnancy (Beta-hCG, Qual), Urine    ibuprofen tablet 800 mg (ADVIL,MOTRIN)    miSOPROStol (CYTOTEC) 200 MCG tablet    Chlamydia trachomatis & Neisseria gonorrhoeae, Amplified Detection    Gynecologic Cytology (PAP Smear)           This transcription uses " voice recognition software, which may contain typographical errors.

## 2021-06-13 NOTE — PROGRESS NOTES
Insertion of IUD  Date/Time: 2017 9:15 AM  Performed by: MARILIN SHRESTHA  Authorized by: MARILIN SHRESTHA   Consent: Verbal consent obtained.  Risks and benefits: risks, benefits and alternatives were discussed  Consent given by: patient  Comments: IUD Insertion Procedure Note  PREPROCEDURE    Copper allergy? No  Iodine Allergy? No  [place] monogamy? Yes  Hx of PID/STD? None  Patient's last menstrual period was 2017., ; last pap   Current birth control: Ortho Evra Patch.    Patient Counseled Regarding:  Mechanism of action  Effectiveness rates and duration  Complications  Insertion and removal procedures  Discussed warning signs for IUD complications  Instructed to check stings monthly, after menses    Verbal consent obtained.    Patient pre-medicated with Ibuprofen 800mg po x1.    UPT Obtained, result:  Recent Results (from the past 24 hour(s))  -Pregnancy (Beta-hCG, Qual), Urine       Result                                            Value                         Ref Range                       Pregnancy Test, Urine                             Negative                      Negative                        Specific [place], UA                              1.025                         1.001 - 1.030                PROCEDURE  Bimanual exam performed and cervix inspected. Uterus normal size and position. Gonorrhea and Chlamydia swab obtained. Cervix cleansed with betadine x3. Tenaculum applied on anterior lip of cervix. Uterine sound passed to internal os, then unable to pass further. Gentle, firm pressure applied, without success. Due to inability to safely pass uterine sound, the procedure was aborted. Patient tolerated procedure well. Patient will return for IUD insertion in two days after premedication with oral misoprostol.    ASSESSMENT  Attempted Insertion of Mirena IUD.    PLAN  Return for IUD insertion after premedication with misoprostol.  Take 400 mcg night before procedure and 400 mcg  two hours before procedure.

## 2021-06-13 NOTE — PROGRESS NOTES
" Subjective    Abhinav Reid is a 32 y.o. female who presents for IUD insertion.    2 days ago, the patient is here for an IUD insertion.  I was unable to pass the uterine sound beyond the cervical OS.  The procedure was aborted and the patient was instructed to take 400 mcg of Cytotec at bedtime and 400 mcg 2 hours before the procedure.  She returns today for IUD placement       Objective    Blood pressure 92/68, pulse 76, resp. rate 18, height 4' 11\" (1.499 m), weight 125 lb (56.7 kg), last menstrual period 09/14/2017, not currently breastfeeding. Body mass index is 25.25 kg/(m^2). Patient reports that she has never smoked. She has never used smokeless tobacco.    Gen: Alert, no apparent distress.  Heart: Regular rate and rhythm, no murmurs.  Lungs: Clear to auscultation bilaterally, no increased work of breathing.  Abdomen: Soft, non-tender, non-distended, bowel sounds normal.  Extremities: No clubbing, cyanosis, edema.    Results for orders placed or performed in visit on 09/28/17   Pregnancy, Urine   Result Value Ref Range    Pregnancy Test, Urine Negative Negative    Specific Gravity, UA 1.025 1.001 - 1.030     No results found.       Assessment & Plan      Abhinav is a 32 y.o. female who is here today for Contraception      1. Second failed attempt at IUD placement.  Premedication with Cytotec did not accomplish successful insertion.  Referral made to [facility] for IUD insertion.  Please see documentation from procedure notes on 9/26/17 and 9/28/17.    1. Contraception management  Pregnancy, Urine    Ambulatory referral to Obstetrics / Gynecology           This transcription uses voice recognition software, which may contain typographical errors.  "

## 2021-06-13 NOTE — PROGRESS NOTES
Insertion of IUD  Date/Time: 2017 4:42 PM  Performed by: MARILIN SHRESTHA  Authorized by: MARILIN SHRESTHA         IUD Insertion Procedure Note  PREPROCEDURE      Copper allergy? No    Iodine Allergy? No    [place] monogamy? Yes    Hx of PID/STD? no    Patient's last menstrual period was 2017., ; last pap 17 - result pending.    Current birth control: None.    Patient Counseled Regarding:    Mechanism of action    Effectiveness rates and duration    Complications    Insertion and removal procedures    Discussed warning signs for IUD complications    Instructed to check stings monthly, after menses    Instructed IUD must not be in place longer than 5 years     Verbal consent obtained.    Patient pre-medicated with Ibuprofen 800mg po x1.    UPT Obtained, result:  Recent Results (from the past 24 hour(s))   Pregnancy, Urine   Result Value Ref Range    Pregnancy Test, Urine Negative Negative    Specific Gravity, UA 1.025 1.001 - 1.030       PROCEDURE  Bimanual exam performed and cervix inspected. Uterus normal size and position. Gonorrhea and Chlamydia swab obtained. Cervix cleansed with betadine x3. Tenaculum applied on anterior lip of cervix. Uterus sound passed to 3 cm and hit resistance. Gentle tension applied on tenaculum, and gentle pressure on cervix, resulted in no advancement of uterine sound.    ASSESSMENT  Attempted isertion of Mirena IUD, UNSUCCESSFUL.     PLAN  Referral to OB/Gyn

## 2021-06-13 NOTE — PROGRESS NOTES
" Chery Reid is a 32 y.o. female who presents for discussion of clotting disorder.    The patient is here today to discuss her newly diagnosed clotting disorder and contraception.    The patient has a son who has lupus anticoagulant and had a clot.  The patient was tested for lupus anticoagulant because of her son's history and was found to have a positive lupus anticoagulant test.  She has never had a problem with clots.  She has not had problems with recurrent pregnancy loss.  As far as she knows, no one else in her family, except for her son have had problems with clots.  No family history of recurrent pregnancy loss.    The patient has been using birth control patch.  When her lupus anticoagulant test came back positive, she was advised to discontinue this and come in to discuss birth control options.  She did not really like the birth control pills or patch because it made her feel depressed.  She has previously used a ParaGard IUD, but it made her bleed too much.  She thinks that she has done with her family, but might consider having one more child.  She would really like to have a daughter.  Not interested in permanent contraception until she is sure that she is done with her family.       Objective    Blood pressure 94/72, pulse 77, temperature 97.2  F (36.2  C), temperature source Oral, resp. rate 17, height 4' 11\" (1.499 m), weight 125 lb (56.7 kg), last menstrual period 09/14/2017, not currently breastfeeding. Body mass index is 25.25 kg/(m^2). Patient reports that she has never smoked. She has never used smokeless tobacco.    Gen: Alert, no apparent distress.  Heart: Regular rate and rhythm, no murmurs.  Lungs: Clear to auscultation bilaterally, no increased work of breathing.  Abdomen: Soft, non-tender, non-distended, bowel sounds normal.  Extremities: No clubbing, cyanosis, edema.    Results for orders placed or performed in visit on 07/17/17   Lupus Anticoagulant   Result Value Ref Range    " "Lupus Anticoagulant Screen 52 (H) <=47 sec   HM2(CBC w/o Differential)   Result Value Ref Range    WBC 5.5 4.0 - 11.0 thou/uL    RBC 4.30 3.80 - 5.40 mill/uL    Hemoglobin 12.0 12.0 - 16.0 g/dL    Hematocrit 35.9 35.0 - 47.0 %    MCV 83 80 - 100 fL    MCH 27.9 27.0 - 34.0 pg    MCHC 33.4 32.0 - 36.0 g/dL    RDW 11.8 11.0 - 14.5 %    Platelets 272 140 - 440 thou/uL    MPV 7.4 7.0 - 10.0 fL   Iron and Transferrin Iron Binding Capacity   Result Value Ref Range    Iron 26 (L) 42 - 175 ug/dL    Transferrin 317 212 - 360 mg/dL    Transferrin Saturation, Calculated 7 (L) 20 - 50 %    Transferrin IBC, Calculated 396 313 - 563 ug/dL   Vitamin D, Total (25-Hydroxy)   Result Value Ref Range    Vitamin D, Total (25-Hydroxy) 26.1 (L) 30.0 - 80.0 ng/mL   Lupus Anticoagulant Confirmation   Result Value Ref Range    Lupus Anti Coag Confirmation 40 <=40 seconds    LA Screen / LA Confirm Ratio 1.3 (H) <=1.2   APTT Inhibitor Screen   Result Value Ref Range    PTT 43 (H) 24 - 37 seconds    APTT Normal Plasma 28 24 - 37 seconds    APTT Correct(1:1Mix) 33 24 - 37 seconds   STA Clot LA Assay   Result Value Ref Range    STA Clot Diluted 95.6 (H) 0 - 63 sec    STA Clot Hexagonal 65.8 (H) 0 - 58 sec    STA Clot Difference 29.8 (H) <=11.1 sec     No results found.       Assessment & Plan      Abhinav is a 32 y.o. female who is here today for Follow-up (blood clots)      1. Lupus anticoagulant positive without antiphospholipid antibody syndrome (no history of clot).  Recommend avoidance of estrogens.  Discontinued birth control patch.  Estrogens added to \"allergy\" list.  Discussed risks of clots with prolonged immobility, surgery, injuries.  Due to no history of clot, anticoagulation is not advised at this time.  2. Contraception counseling in patients with clotting disorder.  Discussed options including permanent contraception (patient not interested, considering more children), ParaGard IUD (caused excessive bleeding), Mirena IUD (not super " excited about medicine on the IUD), Depo-Provera/Nexplanon (small increased risk of clotting), condoms/withdrawal (not as effective).  Per discussion of each of the options, the patient elects to proceed with Mirena IUD insertion.  Scheduled this for next week.  She just completed her menstrual period.  She plans to abstain from intercourse until IUD placement.  3. [place] language.   used.       Total time: 25 minutes. Greater than 50% spent in counseling and coordination of care regarding: new diagnosis of thrombophilia and contraception counseling.     1. Lupus anticoagulant positive     2. Encounter for counseling regarding contraception             This transcription uses voice recognition software, which may contain typographical errors.

## 2021-06-15 NOTE — PROGRESS NOTES
ASSESSMENT/PLAN:  1. Anemia  Hemoglobin    Ambulatory referral to Gynecology   2. Menorrhagia with regular cycle  Ambulatory referral to Gynecology   3. Lupus anticoagulant positive         This is a 31 yo female with:  1.  Anemia - patient here yesterday with hemoglobin of 7.0.  Today's hemoglobin is 7.1.  This is due to her heavy menstrual bleeding (including extrusion of Mirena IUD (that had been placed just 6 weeks earlier)).  She is still bleeding.  Symptomatic with lightheadedness - BP 90/52, pulse normal.  Patient appears pale but otherwise stable.  Given the underlying positive lupus anticoagulant, I cannot give her estrogen/hormones to stop her bleeding.  She states that she is not interested in future pregnancies.  May be a good candidate for surgical options (D&C, ablation?).  Will refer to gynecology.  2.  Menorrhagia - as above, will refer  3.  Lupus anticoagulant positivity - limits options for treatment of bleeding.          There are no discontinued medications.  There are no Patient Instructions on file for this visit.    Chief Complaint:  Chief Complaint   Patient presents with     Follow-up     hemoglobin       HPI:   Abhinav Reid is a 32 y.o. female c/o  Still feels lightheaded  Still having menstrual bleeding -   No further big clots, but still fairly heavy  Not interested in having more pregnancies  But also not interested in an IUD    PMH:   Patient Active Problem List    Diagnosis Date Noted     History of use of contraceptive intrauterine device (IUD) 12/27/2017     Menorrhagia with regular cycle 12/26/2017     Lupus anticoagulant positive 07/20/2017     Language barrier 07/17/2017     Financial difficulties 07/17/2017     Iron deficiency anemia secondary to inadequate dietary iron intake      Vitamin D deficiency      Past Medical History:   Diagnosis Date     Iron deficiency anemia      Past Surgical History:   Procedure Laterality Date     NO PAST SURGERIES       Social History     Social  History     Marital status:      Spouse name: Haider Roman     Number of children: 2     Years of education: High school     Occupational History     Not on file.     Social History Main Topics     Smoking status: Never Smoker     Smokeless tobacco: Never Used     Alcohol use Not on file     Drug use: Not on file      Comment: Betel Nut Use     Sexual activity: Not on file     Other Topics Concern     Not on file     Social History Narrative       Meds:    Current Outpatient Prescriptions:      ferrous sulfate 325 (65 FE) MG tablet, Take 1 tablet (325 mg total) by mouth 2 (two) times a day. Take with orange juice., Disp: 60 tablet, Rfl: 1    Current Facility-Administered Medications:      ibuprofen tablet 800 mg (ADVIL,MOTRIN), 800 mg, Oral, Once, Eleanor Steinberg MD    Allergies:  Allergies   Allergen Reactions     Estrogens Other (See Comments)     Lupus anticoagulant positive       ROS:  Pertinent positives as noted in HPI; otherwise 12 point ROS negative.      Physical Exam:  EXAM:  BP 90/52 (Patient Site: Left Arm, Patient Position: Sitting, Cuff Size: Adult Regular)  Pulse 91  Temp 98.1  F (36.7  C) (Oral)   Resp 14  Wt 125 lb 12.8 oz (57.1 kg)  SpO2 99%  BMI 25.41 kg/m2   Gen:  NAD, appears well, well-hydrated  HEENT:  TMs nl, oropharynx benign, nasal mucosa nl, conjunctiva clear  Neck:  Supple, no adenopathy, no thyromegaly, no carotid bruits, no JVD  Lungs:  Clear to auscultation bilaterally  Cor:  RRR no murmur  Abd:  Soft, nontender, BS+, no masses, no guarding or rebound, no HSM  Extr:  Neg.  Neuro:  No asymmetry  Skin:  Warm/dry, pale        Results:  Results for orders placed or performed in visit on 12/27/17   Hemoglobin   Result Value Ref Range    Hemoglobin 7.1 (LL) 12.0 - 16.0 g/dL

## 2021-06-15 NOTE — PROGRESS NOTES
Subjective    Abhinav Reid is a 32 y.o. female who presents for follow-up of recent ER visit.    [facility] 12/27/2017[facility] was feeling dizzy and lightheaded was having heavy menstrual bleeding.  There, her hemoglobin was found to be 7.2.  This is actually stable from her clinic visit.  They they transfused 2 units of packed red blood cells and discharge her to home.  An ultrasound at that time showed blood in the uterus.  Since her transfusion, she is feeling well.  No further heavy menstrual bleeding.    The patient and her  are sure that they are done having children.  They are planning to do a vasectomy for contraception.  However she still needs treatment of her menorrhagia.  Hormonal birth control is contraindicated due to her testing which is positive for lupus anticoagulant syndrome.  She was tested for lupus anticoagulant syndrome because of a son who has lupus anticoagulant and had a blood clot.  The patient herself has not experienced blood clots.     Objective    Blood pressure 102/64, pulse 92, weight 127 lb (57.6 kg), SpO2 99 %, not currently breastfeeding. Body mass index is 25.65 kg/(m^2). Patient reports that she has never smoked. She has never used smokeless tobacco.    Gen: Alert, no apparent distress.  Heart: Regular rate and rhythm, no murmurs.  Lungs: Clear to auscultation bilaterally, no increased work of breathing.  Abdomen: Soft, non-tender, non-distended, bowel sounds normal.  Extremities: No clubbing, cyanosis, edema.    Results for orders placed or performed in visit on 12/27/17   Hemoglobin   Result Value Ref Range    Hemoglobin 7.1 (LL) 12.0 - 16.0 g/dL     No results found.       Assessment & Plan      Abhinav is a 32 y.o. female who is here today for Follow-up (ER - [facility])      1. Acute blood loss anemia.  Bleeding is stopped.  Status post 2 units of packed red blood cells.  No symptoms of anemia now.  2. Menorrhagia.  Referred to  OB/GYN to discuss endometrial ablation.  Her  is planning to have a vasectomy for contraception.  She has had a Mirena IUD, however it took 3 attempts to place it, and then she spontaneously expelled it.  She has no interest in IUD.  She does not want to have more children.    1. Lupus anticoagulant positive  Ambulatory referral to Obstetrics / Gynecology   2. Iron deficiency anemia secondary to inadequate dietary iron intake  Ambulatory referral to Obstetrics / Gynecology   3. Menorrhagia with regular cycle  Ambulatory referral to Obstetrics / Gynecology       No future appointments.     This transcription uses voice recognition software, which may contain typographical errors.

## 2021-06-15 NOTE — PROGRESS NOTES
"ASSESSMENT/PLAN:  1. Severe anemia     2. Menorrhagia with regular cycle  Hemoglobin   3. History of use of contraceptive intrauterine device (IUD)     4. Lupus anticoagulant positive         This is a 31 yo female with menorrhagia - comes in with heavy bleeding and reports that her IUD \"fell out\" on 12/22/17.  Had called earlier to report that her IUD felt \"unusual\"/malpositioned.  By the time she got to the appointment, reports that she saw the IUD with a large clot.  Did not retrieve the IUD for verification.  However, exam suggests that there are no visible strings and no at least superficial evidence for an IUD at this time.  She continues to have heavy menstrual bleeding and reports lightheadedness with this.  Has h/o severe anemia in past (?related to pregnancy vs. Menstrual bleeding).  A hemoglobin was checked and is 7.3 (reported by lab) and will be sent out to reference lab.  She will come back to see me tomorrow a.m. For recheck.  The patient has a h/o positive lupus anticoagulant so hormonal stoppage of bleeding is difficult.  May need surgical intervention.  Again, I'll see her back tomorrow and decide if she needs more urgent intervention.  Will start Ferrous sulfate 325 mg po BID as well.  Disucssed warning signs/sx that should prompt ER evaluation before tomorrow morning.  Patient notes good understanding.         Medications Discontinued During This Encounter   Medication Reason     norelgestromin-ethinyl estradiol (ORTHO EVRA) 150-35 mcg/24 hr Therapy completed     miSOPROStol (CYTOTEC) 200 MCG tablet Therapy completed     ferrous sulfate 325 mg (65 mg iron) CpER Therapy completed     There are no Patient Instructions on file for this visit.    Chief Complaint:  Chief Complaint   Patient presents with     other     check IUD       HPI:   Abhinav Reid is a 32 y.o. female c/o  Had IUD insertion in September -   Saw IUD pass last week - had lots of bleeding  Still having a lot of bleeding - getting better " -   Not necessarily usual to have this kind of bleeding   Does NOT want another IUD    PMH:   Patient Active Problem List    Diagnosis Date Noted     History of use of contraceptive intrauterine device (IUD) 12/27/2017     Menorrhagia with regular cycle 12/26/2017     Lupus anticoagulant positive 07/20/2017     Language barrier 07/17/2017     Financial difficulties 07/17/2017     Iron deficiency anemia secondary to inadequate dietary iron intake      Vitamin D deficiency      Past Medical History:   Diagnosis Date     Iron deficiency anemia      Past Surgical History:   Procedure Laterality Date     NO PAST SURGERIES       Social History     Social History     Marital status:      Spouse name: Haider Roman     Number of children: 2     Years of education: High school     Occupational History     Not on file.     Social History Main Topics     Smoking status: Never Smoker     Smokeless tobacco: Never Used     Alcohol use Not on file     Drug use: Not on file      Comment: Betel Nut Use     Sexual activity: Not on file     Other Topics Concern     Not on file     Social History Narrative       Meds:    Current Outpatient Prescriptions:      ferrous sulfate 325 (65 FE) MG tablet, Take 1 tablet (325 mg total) by mouth 2 (two) times a day. Take with orange juice., Disp: 60 tablet, Rfl: 1    Current Facility-Administered Medications:      ibuprofen tablet 800 mg (ADVIL,MOTRIN), 800 mg, Oral, Once, Eleanor Steinberg MD    Allergies:  Allergies   Allergen Reactions     Estrogens Other (See Comments)     Lupus anticoagulant positive       ROS:  Pertinent positives as noted in HPI; otherwise 12 point ROS negative.      Physical Exam:  EXAM:  BP 96/60 (Patient Site: Left Arm, Patient Position: Sitting, Cuff Size: Adult Regular)  Pulse (!) 104  Temp 98.3  F (36.8  C) (Oral)   Resp 14  Wt 125 lb 9.6 oz (57 kg)  SpO2 99%  BMI 25.37 kg/m2   Gen:  NAD, appears well, well-hydrated  HEENT:  TMs nl, oropharynx benign, nasal  mucosa nl, conjunctiva clear  Neck:  Supple, no adenopathy, no thyromegaly, no carotid bruits, no JVD  Lungs:  Clear to auscultation bilaterally  Cor:  RRR no murmur  Abd:  Soft, nontender, BS+, no masses, no guarding or rebound, no HSM  PELVIC EXAM:External genitalia: normal  Vaginal mucosa normal  Vaginal discharge: large amounts of blood  Speculum exam shows a normal appearing cervix - os slightly open, no IUD strings noted and no IUD with probing at os; 2 large clots extruded from oss .   Bimanual exam: non tender  to motion.  Uterus  firm, regular, mobile, non tender to palpation. No adnexal masses or tenderness.   Extr:  Neg.  Neuro:  No asymmetry  Skin:  Warm/dry, pale        Results:  Results for orders placed or performed in visit on 12/26/17   Hemoglobin   Result Value Ref Range    Hemoglobin 7.0 (L) 12.0 - 16.0 g/dL

## 2021-06-16 PROBLEM — Z60.3 LANGUAGE BARRIER: Status: ACTIVE | Noted: 2017-07-17

## 2021-06-16 PROBLEM — Z59.9 FINANCIAL DIFFICULTIES: Status: ACTIVE | Noted: 2017-07-17

## 2021-06-16 PROBLEM — Z75.8 LANGUAGE BARRIER: Status: ACTIVE | Noted: 2017-07-17

## 2021-06-16 NOTE — PROGRESS NOTES
Abhinav was an add on today. She was in to see Dr. Steinberg for a pre-op exam. She received a closing notice for her PMAP with care ending 2/28/2018, her surgery is 3/9/2018. WINNIE contacted [facility] and was on a 40 minute hold time before a worker took the call. Abhinav said that she completed the renewal sent it in(forgot to sign it, huge denial issue) [facility] sent her another one to sign and she signed and returned aka mailed  it on 2/7/2018. [facility] worker stated that their scanning system has been down for 3 to 4 weeks, so her mail is setting in a que waiting to be scanned in. The closing notice she received was auto generated by the system because her proof was not yet scanned in the system, so it is showing as not received. WINNIE said that she needed to have her coverage active and that they needed to expedite her case information. They did say that IF it was received and scanned in at a later date that her coverage would be effective 3/1/2018 without a gap in coverage. Worker was going to note that her renewal needed to be expedited. WINNIE will call on Monday to see if that happened.

## 2021-06-16 NOTE — PROGRESS NOTES
Preoperative Exam    Scheduled Procedure: Laparoscopic Tubal Ligation, Hysteroscopy D&C, and Endometrial Ablation  Surgery Date:  3/9/2018  Surgery Location: [facility], fax [phone]    Surgeon:  Hanane Key [facility]/G[facility]    Assessment/Plan:     Problem List Items Addressed This Visit        Medium    Iron deficiency anemia    Relevant Medications    ferrous sulfate 325 (65 FE) MG tablet    Other Relevant Orders    HM2(CBC w/o Differential) (Completed)    Iron and Transferrin Iron Binding Capacity    Lupus anticoagulant positive    Menorrhagia with regular cycle - Primary      Other Visit Diagnoses     Request for sterilization            Surgical Procedure Risk: Low (reported cardiac risk generally < 1%)  Have you had prior anesthesia?: No  Have you or any family members had a previous anesthesia reaction:  No  Do you or any family members have a history of a clotting or bleeding disorder?: Yes: Self and Son  Cardiac Risk Assessment: no increased risk for major cardiac complications    Patient approved for surgery with general or local anesthesia.    Please Note:  The patient has a clotting disorder.  She is at increased risk for blood clots.  The patient will need a pregnancy test at the time of surgery.    Functional Status: Independent  Patient plans to recover at home with family.     Subjective:     CC:   Abhinav Reid is a 32 y.o. female who presents for a preoperative consultation.      HPI:  The patient is a  who has a expressed interest in permanent birth control and treatment of heavy menstrual bleeding.  Patient was previously on a hormone containing form of birth control.  However, her son was found to have lupus anticoagulant disorder due to a blood clot.  The patient was tested and found to have lupus anticoagulant syndrome.  Patient does not have a history of blood clots.  Due to lupus anticoagulant syndrome, estrogen-containing birth control was contraindicated.  Abhinav Reid  also has menorrhagia.  In December 2017, her hemoglobin had fallen to 7.0 due to her menorrhagia, and she received 2 units of packed red blood cells at [facility] ER.  She was discharged home on oral iron, which she tolerated well.  She stopped taking that when she started to feel better.  She has previously tried an IUD for control of menorrhagia.  It took 3 attempts to insert it.  Then it self-expelled.  She is no longer interested in IUD.  She is done having children.  She understands that having her tubes tied is permanent and not reversible.  She is currently using abstinence for contraception.     ROS:  All other systems reviewed and are negative, other than those listed in the HPI.    Pertinent History  Do you have difficulty breathing or chest pain after walking up a flight of stairs: No  History of obstructive sleep apnea: No  Steroid use in the last 6 months: No  Frequent Aspirin/NSAID use: No  Prior Blood Transfusion: Yes: in December 2017  Prior Blood Transfusion Reaction: No  If for some reason prior to, during or after the procedure, if it is medically indicated, would you be willing to have a blood transfusion?:  There is no transfusion refusal.    Current Outpatient Prescriptions   Medication Sig Dispense Refill     ferrous sulfate 325 (65 FE) MG tablet Take 1 tablet (325 mg total) by mouth daily with breakfast. Take with orange juice. 100 tablet 1     Current Facility-Administered Medications   Medication Dose Route Frequency Provider Last Rate Last Dose     ibuprofen tablet 800 mg (ADVIL,MOTRIN)  800 mg Oral Once Eleanor Steinberg MD            Allergies   Allergen Reactions     Estrogens Other (See Comments)     Lupus anticoagulant positive       Patient Active Problem List   Diagnosis     Vitamin D deficiency     Iron deficiency anemia     Language barrier     Financial difficulties     Lupus anticoagulant positive     Menorrhagia with regular cycle       Past Medical History:   Diagnosis  "Date     Medical history non-contributory        Past Surgical History:   Procedure Laterality Date     NO PAST SURGERIES         Social History     Social History     Marital status:      Spouse name: Haider Roman     Number of children: 2     Years of education: High school     Occupational History     Mother      P[employer]      4 hours per day     Social History Main Topics     Smoking status: Never Smoker     Smokeless tobacco: Never Used     Alcohol use No     Drug use: No      Comment: Betel Nut Use     Sexual activity: Yes     Partners: Male     Birth control/ protection: None     Other Topics Concern     Not on file     Social History Narrative     No narrative on file       Patient Care Team:  Eleanor Steinberg MD as PCP - General          Objective:     Vitals:    02/27/18 0820   BP: 90/62   Pulse: 78   Resp: 12   Temp: 97  F (36.1  C)   TempSrc: Oral   SpO2: 99%   Weight: 127 lb (57.6 kg)   Height: 4' 11.5\" (1.511 m)   LMP: 01/28/2018         Physical Exam:  PHYSICAL EXAM:   BP 90/62 (Patient Site: Left Arm, Patient Position: Sitting, Cuff Size: Adult Regular)  Pulse 78  Temp 97  F (36.1  C) (Oral)   Resp 12  Ht 4' 11.5\" (1.511 m)  Wt 127 lb (57.6 kg)  LMP 01/28/2018  SpO2 99%  BMI 25.22 kg/m2   History   Smoking Status     Never Smoker   Smokeless Tobacco     Never Used     GENERAL: Alert, NAD.  HEAD: NC/AT.  EARS: Normal canal, pinnae and tympanic membrane.  EYES: PERRL, normal fundi.  NOSE: Normal mucosa.  MOUTH: Adequate dentition, normal throat.  NECK: normal thyroid, midline trachea.  BREASTS: no masses, skin changes, nipple discharge or tenderness.  LUNGS: CTA bilaterally, no increased work of breathing.  HEART: RRR, no m/r/g  ABDOMEN: soft, nt/nd, BS+  : Not examined  MSK: No significant deformity.  SKIN: no atypical lesion or rash.  LYMPHATICS: no lymphadenopathy.   PSYCH: normal affect.    Labs:  Recent Results (from the past 24 hour(s))   HM2(CBC w/o Differential)    Collection Time: " 02/27/18  9:37 AM   Result Value Ref Range    WBC 6.3 4.0 - 11.0 thou/uL    RBC 4.31 3.80 - 5.40 mill/uL    Hemoglobin 12.3 12.0 - 16.0 g/dL    Hematocrit 37.4 35.0 - 47.0 %    MCV 87 80 - 100 fL    MCH 28.6 27.0 - 34.0 pg    MCHC 32.9 32.0 - 36.0 g/dL    RDW 12.0 11.0 - 14.5 %    Platelets 290 140 - 440 thou/uL    MPV 6.8 (L) 7.0 - 10.0 fL        Ref. Range 7/17/2017 10:01 12/26/2017 14:05 12/27/2017 09:52 2/27/2018 09:37   Hemoglobin Latest Ref Range: 12.0 - 16.0 g/dL 12.0 7.0 (L) 7.1 (LL) 12.3       Immunization History   Administered Date(s) Administered     HPV Quadrivalent 01/07/2011, 06/22/2011, 03/06/2012     Hep A, historic 12/08/2010, 06/22/2011     Hep B, historic 12/08/2010, 01/07/2011, 06/22/2011     Influenza, Seasonal, Inj PF IIV3 09/23/2010     Influenza, inj, historic,unspecified 10/01/2012     Influenza, seasonal,quad inj 36+ mos 09/21/2015, 09/19/2017     Influenza, seasonal,quad inj 6-35 mos 11/17/2011, 09/19/2013, 11/20/2014     Influenza,seasonal, Inj IIV3 09/27/2012     MMR 01/01/2011, 06/22/2011, 10/19/2013     Rubella: Immune 11/29/2013     Tdap 01/07/2011, 08/09/2013     Varicella 01/07/2011, 06/22/2011           Electronically signed by Eleanor Steinberg MD 02/27/18 8:22 AM

## 2021-06-17 NOTE — PROGRESS NOTES
Chief Complaint:  Chief Complaint   Patient presents with     Pregnancy Confirmation     #3     HPI:   Abhinav Roman is a 32 y.o. female is here for pregnancy intake.  She is .  Patient's last menstrual period was 2018 (exact date).  She was planning on having a tubal ligation.  Surgery was scheduled for 3/9/2018.  Before surgery she lost her insurance, and surgery was canceled.  She would like to have her tubes tied after this pregnancy.  Positive home pregnancy test yesterday.  She has lupus.  She is taking an iron supplement.  She is having some nausea and vomiting.  She has been taking an herbal medicine from the market for nausea.    Past medical history: reviewed and updated.  Past Medical History:   Diagnosis Date     History of transfusion      Medical history non-contributory      Past Surgical History:   Procedure Laterality Date     NO PAST SURGERIES         Current Outpatient Prescriptions:      ferrous sulfate 325 (65 FE) MG tablet, Take 1 tablet (325 mg total) by mouth daily with breakfast. Take with orange juice., Disp: 100 tablet, Rfl: 1     prenatal vit-iron fum-folic ac (PRENATAL VITAMIN) 27 mg iron- 0.8 mg Tab, Take 1 tablet by mouth once daily., Disp: 100 tablet, Rfl: 3     pyridoxine, vitamin B6, (VITAMIN B-6) 25 MG tablet, Take 1 tablet by mouth 3 times a day, as needed for nausea., Disp: 60 tablet, Rfl: 0  No current facility-administered medications for this visit.     Social:  Social History   Substance Use Topics     Smoking status: Never Smoker     Smokeless tobacco: Never Used     Alcohol use No       OBJECTIVE:  Allergies   Allergen Reactions     Estrogens Other (See Comments)     Lupus anticoagulant positive     Vitals:    18 1522   BP: 122/60   Pulse: 84   Resp: 20     Body mass index is 25.22 kg/(m^2).    Vital signs stable as recorded above  General: Patient is alert and oriented x 3, in no apparent distress  Fetal Exam: Fundal height was not palpable, fetal heart  tones are not heard.    Results:  Results for orders placed or performed in visit on 18   Pregnancy, Urine   Result Value Ref Range    Pregnancy Test, Urine Positive (!) Negative   Urinalysis, OB Screen   Result Value Ref Range    Glucose, UA Negative Negative    Ketones, UA 15 mg/dL (!) Negative    Protein, UA Trace (!) Negative mg/dL     Other screening pregnancy lab work is ordered and pending.    Patient scored a 0/30 on [place]  Depression Screen.    Assessment and Plan:  1. Pregnancy Intake Appointment.  Abhinav Roman is 32 y.o. and .  Patient's last menstrual period was 2018 (exact date).  Estimated Date of Delivery: 10/31/18  She will be seeing Dr. Steinberg for OB care.  Screening pregnancy lab work was drawn.  Prenatal vitamins prescribed.  Problem list and current medications reviewed and updated.  Dating ultrasound ordered.  Prenatal info packet given.  First trimester genetic screening was discussed with patient.  She declines at this time.  I recommended she stop taking the herbal nausea medicine she got from the market.    2.  Lupus.  No bothersome symptoms presently.  Continue to monitor, I will review with her OB doctor.    Follow up in 2-4 weeks.  Please see OB Episode for complete details.  Visit was approximately 40 minutes, greater than 50% of time spent in face-to-face counseling and coordination of care.    This dictation uses voice recognition software, which may contain typographical errors.

## 2021-06-17 NOTE — PROGRESS NOTES
Abhinav came in to see WINNIE. She is pregnant again, but has not had her first OB. SW had called Helen about her case as her children were approved for BC/BS MA and her medical did not start again until 4/1/18. Apparently she and her  were approved for [employer] and have a monthly premium. WINNIE reported the pregnancy, so she will have regular MA coverage back to 12/27/2017, so any outstanding bills will be covered. She will come back to see WINNIE in 2 months. Guesstimated due date by Helen was 11/1/2018.

## 2021-06-17 NOTE — PATIENT INSTRUCTIONS - HE
Patient Instructions by Eleanor Steibnerg MD at 1/31/2019 10:00 AM     Author: Eleanor Steinberg MD Service: -- Author Type: Physician    Filed: 1/31/2019 10:26 AM Encounter Date: 1/31/2019 Status: Addendum    : Eleanor Steinberg MD (Physician)    Related Notes: Original Note by Eleanor Steinberg MD (Physician) filed at 1/31/2019 10:25 AM         Patient Education     Hives (Adult)  Hives are pink or red bumps on the skin. These bumps are also known as wheals. The bumps can itch, burn, or sting. Hives can occur anywhere on the body. They vary in size and shape and can form in clusters. Individual hives can appear and go away quickly. New hives may develop as old ones fade. Hives are common and usually harmless. Occasionally hives are a sign of a serious allergy.  Hives are often caused by an allergic reaction. It may be an allergic reaction to foods such as fruit, shellfish, chocolate, nuts, or tomatoes. It may be a reaction to pollens, animal fur, or mold spores. Medicines, chemicals, and insect bites can also cause hives. And hives can be caused by hot sun or cold air. The cause of hives can be difficult to find.  You may be given medicines to relieve swelling and itching. Follow all instructions when using these medicines. The hives will usually fade in a few days, but can last up to 2 weeks.  Home care  Follow these tips:    Dont scratch the hives. Scratching will delay healing. To reduce itching, apply cool, wet compresses to the skin.    Dress in soft, loose cotton clothing.    Dont bathe in hot water. This can make the itching worse.    Apply an ice pack or cool pack wrapped in a thin towel to your skin. This will help reduce redness and itching. But if your hives were caused by exposure to cold, then do not apply more cold to them.

## 2021-06-17 NOTE — PROGRESS NOTES
First OB Appointment    Assessment & Plan:  1. Lupus anticoagulant positive. Son had history of clot in infancy (cavernous sinus thrombosis). The patient has NOT had clot, pre-eclampsia, or  delivery. Not a candidate for LMWH/heparin. Discussed risks/benefits of aspirin. Rx aspirin 81 mg daily. She does not have a diagnosis of SLE.  2. Unplanned pregnancy. Planning PPTL.  3. Betel nut use. Advised cessation.  4. Discussed dating. Final EDC based on 8w2d US, not consistent with LMP.  5. Genetic screening. Discussed options. Patient would like quad screen and anatomic survey. She declines first trimester screening/[facility].  6. Language barrier. Elizabeth  present.    Subjective:  This is a unplanned pregnancy. The patient feels OK about it. Current pregnancy symptoms include: mild nausea.     Reviewed history of Lupus anticoagulant positive. Son had history of clot in infancy (cavernous sinus thrombosis). The patient has NOT had clot, pre-eclampsia, or  delivery. Reviewed with the patient that she does NOT have Lupus.     She is using betel nut. It helps with excess salivation.    I reviewed the following components of the patient chart today:    OB intake note    Active problems    Past Medical History    Medications    Allergies    Family History    Social History    Genetic Questionairre    Prenatal Labs    Prenatal Ultrasound    OB history  OB History    Para Term  AB Living   3 2 2   2   SAB TAB Ectopic Multiple Live Births       2      # Outcome Date GA Lbr Osman/2nd Weight Sex Delivery Anes PTL Lv   3 Current            2 Term 10/18/13 40w0d  7 lb 3 oz (3.26 kg) M Vag-Spont  N MINE      Birth Comments: iron deficiency anemia. Betel nut use. . No lacerations.  ml   1 Term 12/31/10 40w3d  6 lb 14 oz (3.118 kg) M Vag-Spont IV N MINE      Birth Comments: Rubella and Varicella non-immune. Poverty. IV fentanyl, AROM, 2nd degree + right labial lac.  mL. Postpartum  endometritis.          Objective:  See prenatal flowsheet and physical exam portion of prenatal episode.    Total time 40 minutes, >50% spent in counseling and coordination of care regarding new pregnancy and review of patient's current health status and pregnancy.

## 2021-06-18 NOTE — PROGRESS NOTES
Subjective:  Taking aspirin.  No problems.    OB ROS:   Headache: None.   Vision change: None.   Abnormal vaginal discharge: None.   Bleeding: None.   Fetal movement: None.     Objective:  Reviewed vitals. Exam - see flowsheet.   Recent Results (from the past 24 hour(s))   Urinalysis, OB Screen (ketones, glucose, protein)   Result Value Ref Range    Glucose, UA Negative Negative    Ketones, UA Negative Negative    Protein, UA Negative Negative mg/dL        Assessment/Plan:    33 y.o.  at 14w2d.  1. Encounter for supervision of other normal pregnancy in second trimester  Urinalysis, OB Screen (ketones, glucose, protein)    prenatal vit-iron fum-folic ac (PRENATAL VITAMIN) 27 mg iron- 0.8 mg Tab      Total time: 15 minutes. Greater than 50% spent in counseling and coordination of care regarding topics, above.

## 2021-06-18 NOTE — PROGRESS NOTES
Subjective:  No problems. Feeling movement now.  Wants quad screen + anatomy US. Hoping for girl.    OB ROS:   Headache: None.   Vision change: None.   Abnormal vaginal discharge: None.   Bleeding: None.   Fetal movement: Normal.     Objective:  Reviewed vitals. Exam - see flowsheet.   Recent Results (from the past 24 hour(s))   Urinalysis, OB Screen (ketones, glucose, protein)   Result Value Ref Range    Glucose, UA Negative Negative    Ketones, UA Negative Negative    Protein, UA Negative Negative mg/dL        Assessment/Plan:    33 y.o.  at 19w2d.    Quad screen + anatomic survey scheduled.  1. Encounter for supervision of other normal pregnancy in second trimester  Urinalysis, OB Screen (ketones, glucose, protein)    US OB > = 14 Weeks    Quad Screen (Second Trimester) Maternal      Total time: 15 minutes. Greater than 50% spent in counseling and coordination of care regarding topics, above.

## 2021-06-19 NOTE — PROGRESS NOTES
Subjective:  No problems.    OB ROS:   Headache: None.   Vision change: None.   Abnormal vaginal discharge: None.   Bleeding: None.   Fetal movement: Normal.     Objective:  Reviewed vitals. Exam - see flowsheet.   Recent Results (from the past 24 hour(s))   Urinalysis, OB Screen (ketones, glucose, protein)   Result Value Ref Range    Glucose, UA Negative Negative    Ketones, UA Negative Negative    Protein, UA Negative Negative mg/dL        Assessment/Plan:    33 y.o.  at 24w3d.    1hr GCT at next visit.  1. Encounter for supervision of other normal pregnancy in second trimester  Urinalysis, OB Screen (ketones, glucose, protein)    RPR    Hemoglobin    Glucose,Gestational Challenge (1 Hour)   2. Normal pregnancy in second trimester        Total time: 15 minutes. Greater than 50% spent in counseling and coordination of care regarding topics, above.

## 2021-06-19 NOTE — PROGRESS NOTES
Subjective:  No problems. Here for 1hr GCT.    The patient is interested in a postpartum tubal ligation.  We have discussed this previously.  She is sure that she wants to be done having children.  She knows that it is permanent and irreversible.  We discussed the procedure and how it is done.  Discussed when it is done.  She would like to sign the consent form.    OB ROS:   Headache: None.   Vision change: None.   Abnormal vaginal discharge: None.   Bleeding: None.   Fetal movement: Normal.     Objective:  Reviewed vitals. Exam - see flowsheet.   Recent Results (from the past 24 hour(s))   Urinalysis, OB Screen (ketones, glucose, protein)   Result Value Ref Range    Glucose,  mg/dL (!) Negative    Ketones, UA Negative Negative    Protein, UA Negative Negative mg/dL        Assessment/Plan:    33 y.o.  at 25w2d.    One hour glucose tolerance test, hemoglobin, and syphilis screening done.    Postpartum tubal ligation consent form signed after discussion of risks, benefits, and alternatives.   1. Encounter for supervision of other normal pregnancy in second trimester  Urinalysis, OB Screen (ketones, glucose, protein)    RPR    Hemoglobin    Glucose,Gestational Challenge (1 Hour)      Total time: 15 minutes. Greater than 50% spent in counseling and coordination of care regarding topics, above.  A professional Elizabeth  was used for this visit.

## 2021-06-20 NOTE — PROGRESS NOTES
Subjective:  Advance directive given and completed today.    OB ROS:   Headache: None.   Vision change: None.   Abnormal vaginal discharge: None.   Bleeding: None.   Fetal movement: Normal.     Objective:  Reviewed vitals. Exam - see flowsheet.   Recent Results (from the past 24 hour(s))   Urinalysis, OB Screen (ketones, glucose, protein)   Result Value Ref Range    Glucose, UA Negative Negative    Ketones, UA Negative Negative    Protein, UA Negative Negative mg/dL        Assessment/Plan:    33 y.o.  at 28w6d.    Tdap given.  1. Encounter for supervision of other normal pregnancy in third trimester  Urinalysis, OB Screen (ketones, glucose, protein)    Tdap vaccine greater than or equal to 6yo IM      Total time: 15 minutes. Greater than 50% spent in counseling and coordination of care regarding topics, above.

## 2021-06-20 NOTE — PROGRESS NOTES
ID #: [id number] Agency: [employer] [facility]    Care guide talked to the patient about [facility] and she agreed to enroll. RN Assessment was scheduled for 10/16/18 at 10:00 am.     Upcoming Appointments:  10/16/18 at 10:00 with [facility] RN    Next Outreach: 10/16/18

## 2021-06-20 NOTE — PROGRESS NOTES
Subjective:  No problems. Contractions are occasional.    OB ROS:   Headache: None.   Vision change: None.   Abnormal vaginal discharge: None.   Bleeding: None.   Fetal movement: Normal.     Objective:  Reviewed vitals. Exam - see flowsheet.   Recent Results (from the past 24 hour(s))   Urinalysis, OB Screen (ketones, glucose, protein)   Result Value Ref Range    Glucose, UA Negative Negative    Ketones, UA Negative Negative    Protein, UA Negative Negative mg/dL        Assessment/Plan:    33 y.o.  at 30w5d.    Patient will get flu shot with her kids at next visit.  1. Encounter for supervision of other normal pregnancy in third trimester  Urinalysis, OB Screen (ketones, glucose, protein)    Influenza, Seasonal, Inj      Total time: 15 minutes. Greater than 50% spent in counseling and coordination of care regarding topics, above.

## 2021-06-20 NOTE — PROGRESS NOTES
: Wade ID: [id number] Agency: [employer] [facility]    Attempt 1:  Care guide left a message for the patient about [facility] enrollment.  If the patient is trying to call the Care guide back transfer them to Manpreet Laurent at [phone].    Next Outreach: 10/2/18

## 2021-06-20 NOTE — PROGRESS NOTES
Subjective:  Still needs car seat and crib    OB ROS:   Headache: None.   Vision change: None.   Abnormal vaginal discharge: None.   Bleeding: None.   Fetal movement: Normal.     Objective:  Reviewed vitals. Exam - see flowsheet.   Recent Results (from the past 24 hour(s))   Urinalysis, OB Screen (ketones, glucose, protein)   Result Value Ref Range    Glucose,  mg/dL (!) Negative    Ketones, UA Negative Negative    Protein, UA Negative Negative mg/dL        Assessment/Plan:    33 y.o.  at 34w2d.    Glucosuria - recommend reduced carbs in diet    Financial needs - meet with SW  1. Encounter for supervision of other normal pregnancy in third trimester  Urinalysis, OB Screen (ketones, glucose, protein)      Total time: 15 minutes. Greater than 50% spent in counseling and coordination of care regarding topics, above.

## 2021-06-21 NOTE — PROGRESS NOTES
Subjective:  No problems    OB ROS:   Headache: None.   Vision change: None.   Abnormal vaginal discharge: None.   Bleeding: None.   Fetal movement: Normal.     Objective:  Reviewed vitals. Exam - see flowsheet.   Recent Results (from the past 24 hour(s))   Urinalysis, OB Screen (ketones, glucose, protein)   Result Value Ref Range    Glucose, UA Negative Negative    Ketones, UA Negative Negative    Protein, UA Negative Negative mg/dL        Assessment/Plan:    33 y.o.  at 36w5d.    GBS obtained  1. Encounter for supervision of other normal pregnancy in third trimester  Urinalysis, OB Screen (ketones, glucose, protein)    Group B Strep Screen by PCR   2. Encounter for supervision of other normal pregnancy in second trimester  prenatal vit-iron fum-folic ac (PRENATAL VITAMIN) 27 mg iron- 0.8 mg Tab      Total time: 15 minutes. Greater than 50% spent in counseling and coordination of care regarding topics, above.

## 2021-06-21 NOTE — ANESTHESIA POSTPROCEDURE EVALUATION
Patient: Abhinav Reid Jessie  LIGATION, FALLOPIAN TUBE, POSTPARTUM  Anesthesia type: general    Patient location: PACU  Last vitals:   Vitals:    11/18/18 0950   BP: 110/73   Pulse: 84   Resp: 16   Temp:    SpO2: 97%     Post vital signs: stable  Level of consciousness: awake and responds to simple questions  Post-anesthesia pain: pain controlled  Post-anesthesia nausea and vomiting: no  Pulmonary: room air  Cardiovascular: stable and blood pressure at baseline  Hydration: adequate  Anesthetic events: no    QCDR Measures:  ASA# 11 - Dannielle-op Cardiac Arrest: ASA11B - Patient did NOT experience unanticipated cardiac arrest  ASA# 12 - Dannielle-op Mortality Rate: ASA12B - Patient did NOT die  ASA# 13 - PACU Re-Intubation Rate: ASA13B - Patient did NOT require a new airway mgmt  ASA# 10 - Composite Anes Safety: ASA10A - No serious adverse event    Additional Notes:

## 2021-06-21 NOTE — PROGRESS NOTES
"33yr F PMH:  Vitamin D deficiancy, language barrier, lupus anticoagulant deficiancy, rubella non-immune status, betel nut use.  34 wks pregnant with 3rd child.  Previous 2 pregnancy's \"normal\" per patient report.  Currently lives in public housing with spouse and 7 & 5 yr old sons, 2 bedroom home. Mother, Father & Sister live in same complex for support system.   drives for grocery shopping, dr's appt's, rx .  Denies pain.  Patient able to read \"some English\" to take her medications/vitamins out of the bottles on a daily basis.  Has a car seat prepared for the baby.  Patient stating \"I need a crib\" and 'Insurance' for the baby.  Writer is aware that [facility] no longer is able to assist with cribs.  Will have [facility] SW give [facility] CG the crib resources.  Insurance for the baby is not established until the baby is born.  This is completed by the SW at the hospital.  Instructed the patient if for some reason she has any difficulties with this process to please notify [facility] CG at her very first post partum appointment and [facility] would assist with this process.  However patient not enrolled at this time.       RN Recommendations and Referrals  Patient not enrolled into [facility].   Will get crib resources for patient.  [place] Sure or Insurance will be taken care of when baby is born in the hospital by SW.    Action Plan    RN Will  Will not add the patient to [facility] tracking list  Be available to the patient as nursing needs arise   Will inquire from [facility] SW the potential/possible crib resources [facility] uses at this time for patients in need of cribs.  Care Guide Will  Mail out crib resources to the patient.   Will f/u with patient after [facility] SW responds on crib resources [facility] uses at this times.    Goals    Clinic Care Coordination RN Assessed Needs  Patient Centered Assessment Method-PCAM TOTAL SCORE: 15 (10/16/2018 10:39 AM)  Level 1:  A score of 12-24 indicates that the patient has very little to no initial need for RN or SW intervention. "  Standard care guide outreach/support should be appropriate at the discretion of the .  The care guide can reach out to the RN/SW as needed for support or with new concerns.      PCAM (Patient Centered Assessment Method)   HEALTH AND WELL-BEING  Other Physical Health Concerns:: vitamin D deficiency, financial difficulties, lupus anticoagulant positive rubella, betel nut use  RN Assessment: Physical Health Needs: No identified areas of uncertainty or problems already being investigated  RN Assessment: Physical Health Problems: No identified areas of concern  RN Assessment:Other Mental Well-Being Concern: No identified areas of concern  RN Assessment: Lifestyle Behaviors: No identified areas of concern  SOCIAL ENVIRONMENT  RN Assessment: Home Environment: Consistently safe, supportive, stable, no identified problems  Daily Activities Concerns: Denies concerns that require further investigation  RN Assessment: Daily Activites: No identified problems or perceived positive benefits  RN Assessment: Social Network: Good participation with social networks  Financial Status and Service Concerns: Unemployed  RN Assessment: Financial Resources: Financially insecure, some resource challenges  HEALTH LITERACY AND COMMUNICATION  Understanding of Health and Wellbeing Concerns: Non-English speaking  RN Assessment: Health Literacy: Reasonable to good understanding and already engages in managing health or is willing to undertake better management  Engagement Concerns: Adequate communication, with or without minor barriers  RN Assessment: Engagement: Adequate communication, with or without minor barriers  SERVICE COORDINATION  Other Services: Other care/services not required at this time  Coordination of Services: All required care/services in place and well coordinated  PCAM TOTAL SCORE: 15      Emergency Plan  Emergency Plan Recommendation:    When to Use the Emergency Department (ED)  An emergency means you could die if  you don t get care quickly. Or you could be hurt permanently (disabled). Read below to know when to use -- and when not to use -- an emergency department (also called ED).    Dangers to your life  Here are examples of emergencies. These need immediate care:  A hard time breathing  Severe chest pain  Choking  Severe bleeding  Suddenly not able to move or speak  Blacking out (fainting)`  Poisoning    Dangers of permanent injuries  Here are other emergencies. These also need immediate care:  Deep cuts or severe burns  Broken bones, or sudden severe pain and swelling in a joint    When it s an emergency  If you have an emergency, follow these steps:    1. Go to the nearest emergency department  If you can, go to the hospital ED closest to you right away.  If you cannot get there right away, or if it is not safe to take yourself, call 911 or your police emergency number.  2. Call your primary care doctor  Tell your doctor about the emergency. Call within 24 hours of going to the ED.  If you cannot call, have someone call for you.  Go to your doctor (not the ED) for any follow-up care.    When it s not an emergency  If a problem is not an emergency, follow these steps:    1. Call your primary care doctor  If you don t know the name of your doctor, call your health plan.  If you cannot call, have someone call for you.  2. Follow instructions  Your doctor will tell you what you should do.  You may be told to see your doctor right away. You may be told to go to the ED. Or you may be told to go to an urgent care center.  Follow your doctor s advice.

## 2021-06-21 NOTE — PROGRESS NOTES
Subjective:  Occasional cramping. Baby active, but smaller kicks now.    OB ROS:   Headache: None.   Vision change: None.   Abnormal vaginal discharge: None.   Bleeding: None.   Fetal movement: Normal.     Objective:  Reviewed vitals. Exam - see flowsheet.   Recent Results (from the past 24 hour(s))   Urinalysis, OB Screen (ketones, glucose, protein)   Result Value Ref Range    Glucose, UA Negative Negative    Ketones, UA Negative Negative    Protein, UA Negative Negative mg/dL        Assessment/Plan:    33 y.o.  at 38w2d.  1. Encounter for supervision of other normal pregnancy in third trimester  Urinalysis, OB Screen (ketones, glucose, protein)      Total time: 15 minutes. Greater than 50% spent in counseling and coordination of care regarding topics, above.         Spontaneous, unlabored and symmetrical

## 2021-06-21 NOTE — PROGRESS NOTES
Care guide followed up on the delegation from the [facility] RN.      Care Guide Will  Mail out crib resources to the patient.   Will f/u with patient after [facility] SW responds on crib resources [facility] uses at this times. Resource sent out in the mail to the patient. Delegation complete 10/17/18.

## 2021-06-21 NOTE — PROGRESS NOTES
: Tanner Colon Agency: [facility]    Patient came into the clinic for her RN Assessment. During the assessment the only concerns for the patient were obtaining a crib and making sure that she had insurance for the baby. There were no other concerns and from the RN Assessment the RN had decided that enrolling the patient would not be in her best interest. The patient would like resources for a crib and for the insurance the SW at the hospital should help add the baby to the insurance. At this point there is no more outreach needed.

## 2021-06-21 NOTE — PROGRESS NOTES
Subjective:  Still needs crib.  Home nurse will help with crib. Has PHN.  Not taking iron pill. Never got it. Not taking pyridoxine anymore.    OB ROS:   Headache: None.   Vision change: None.   Abnormal vaginal discharge: None.   Bleeding: None.   Fetal movement: Normal.     Objective:  Reviewed vitals. Exam - see flowsheet.   Recent Results (from the past 24 hour(s))   Urinalysis, OB Screen (ketones, glucose, protein)   Result Value Ref Range    Glucose, UA Negative Negative    Ketones, UA Negative Negative    Protein, UA Negative Negative mg/dL        Assessment/Plan:    33 y.o.  at 37w2d.    Resent iron pill, PNV, aspirin. Stop pyridoxine.    Declines cervix check. GBS neg 10/25/18.    Elizabeth  used.  1. Encounter for supervision of other normal pregnancy in third trimester  Urinalysis, OB Screen (ketones, glucose, protein)      Total time: 15 minutes. Greater than 50% spent in counseling and coordination of care regarding topics, above.

## 2021-06-21 NOTE — PROGRESS NOTES
Subjective    Abhinav Roman is a 33 y.o. female who presents for postoperative check.    The patient delivered via normal spontaneous vaginal delivery on 7/17/18 without complication.  On 11/18/18, she had her postpartum tubal ligation.    Ports that she is feeling well now.  She does not have any significant pain in the abdomen.  She is voiding and stooling normally.    She is breast-feeding now.  He actually only breast-fed her first 2 children for one month each.  But with this baby, she is exclusively breast-feeding and finding that it is going much better that way.  She, in fact, has excessive breast milk and is needed to pump prior to nursing.    The patient was rubella nonimmune at the beginning of pregnancy and did receive her MMR shot postpartum.     Objective    Last menstrual period 01/24/2018, unknown if currently breastfeeding. There is no height or weight on file to calculate BMI. Patient reports that  has never smoked. she has never used smokeless tobacco.    Gen: Alert, no apparent distress.  Heart: Regular rate and rhythm, no murmurs.  Lungs: Clear to auscultation bilaterally, no increased work of breathing.  Abdomen: Soft, non-tender, non-distended, bowel sounds normal.  Umbilical dressing is intact.  Extremities: No clubbing, cyanosis, edema.    Results for orders placed or performed during the hospital encounter of 11/17/18   Syphilis Screen, [facility]   Result Value Ref Range    Treponema Antibody (Syphilis) Negative Negative   Hemoglobin   Result Value Ref Range    Hemoglobin 12.9 12.0 - 16.0 g/dL   Hemoglobin   Result Value Ref Range    Hemoglobin 10.6 (L) 12.0 - 16.0 g/dL   Hemoglobin   Result Value Ref Range    Hemoglobin 9.8 (L) 12.0 - 16.0 g/dL   Surgical Pathology Exam   Result Value Ref Range    Case Report       Surgical Pathology                                Case: [id number]                                    Authorizing Provider:  Paras Vitale MD        Collected:            11/18/2018 0917              Ordering Location:     [facility] OR     Received:            11/19/2018 0729              Pathologist:           Alejandra Almaraz MD                                                          Specimens:   A) - Fallopian Tube, Right, Portion of right fallopian tube                                         B) - Fallopian Tube, Left, Portion of left fallopian tube                                  Final Diagnosis       A&B) PORTIONS OF RIGHT AND LEFT FALLOPIAN TUBES, POSTPARTUM BILATERAL TUBAL LIGATION:            -  SEGMENTS OF HISTOLOGICALLY UNREMARKABLE FALLOPIAN TUBES (X2);                  FULL THICKNESS CROSS-SECTIONS DEMONSTRATED    Microscopic Description       Microscopic examination performed, substantiating the above diagnosis.    Clinical Information       Pre-op Diagnosis:  Encounter for sterilization [Z30.2]    Gross Description       A) The specimen is received in formalin and is identified as portion of right fallopian tube. It consists of a 1.3 cm length by 0.6 cm diameter tubular segment of gray-purple tissue. The lumen is unremarkable on cut section. T&TE-1C    B) The specimen is received in formalin and is identified as portion of left fallopian tube. It consists of a 0.7 cm length by 0.7 cm diameter tubular segment of gray-purple tissue. The lumen is unremarkable on cut section. B&TE-1C [facility]    Charges CPT: 88302 x2  ICD-10: Z98.51     Result Flag  Normal     No results found.       Assessment & Plan      Abhinav is a 33 y.o. female who is here today for Follow-up (Post-partum)      1. Postoperative follow-up after tubal ligation.  Doing well.  2. Postpartum.  Recovering well.  Follow-up in 6 weeks for routine postpartum check.  3. Iron deficiency anemia.  Recommended continuing ferrous sulfate daily until baby is 6 months old.  4. Constipation.  Symptom prescription for docusate to use as needed.  5. Risk of vitamin D deficiency.  Recommended 4000 units of vitamin  D daily for maternal and nursing baby health.    Problem List Items Addressed This Visit     Vitamin D deficiency    Language barrier    Lactating mother    Anemia, unspecified type      Other Visit Diagnoses     Postoperative state    -  Primary    Rubella non-immune status, antepartum               Future Appointments   Date Time Provider Department Center   12/3/2018  9:00 AM Eleanor Steinberg MD [facility]   12/31/2018  9:00 AM Eleanor Steinberg MD [facility]        This transcription uses voice recognition software, which may contain typographical errors.

## 2021-06-21 NOTE — ANESTHESIA PREPROCEDURE EVALUATION
Anesthesia Evaluation      Patient summary reviewed   No history of anesthetic complications     Airway   Mallampati: II  Neck ROM: full   Pulmonary - negative ROS and normal exam                          Cardiovascular - negative ROS and normal exam  Exercise tolerance: > or = 4 METS   Neuro/Psych - negative ROS     Endo/Other - negative ROS      GI/Hepatic/Renal - negative ROS      Other findings: Delivered 1254 yesterday.      Dental - normal exam                        Anesthesia Plan  Planned anesthetic: general endotracheal    ASA 2   Induction: intravenous   Anesthetic plan and risks discussed with: patient  Anesthesia plan special considerations: rapid sequence induction,   Post-op plan: routine recovery

## 2021-06-21 NOTE — ANESTHESIA CARE TRANSFER NOTE
Last vitals:   Vitals:    11/18/18 0939   BP: 113/76   Pulse: (!) 102   Resp: 16   Temp: 37  C (98.6  F)   SpO2: 100%     Patient's level of consciousness is drowsy  Spontaneous respirations: yes  Maintains airway independently: yes  Dentition unchanged: yes  Oropharynx: oropharynx clear of all foreign objects    QCDR Measures:  ASA# 20 - Surgical Safety Checklist: WHO surgical safety checklist completed prior to induction    PQRS# 430 - Adult PONV Prevention: 4558F - Pt received => 2 anti-emetic agents (different classes) preop & intraop  ASA# 8 - Peds PONV Prevention: NA - Not pediatric patient, not GA or 2 or more risk factors NOT present  PQRS# 424 - Dannielle-op Temp Management: 4559F - At least one body temp DOCUMENTED => 35.5C or 95.9F within required timeframe  PQRS# 426 - PACU Transfer Protocol: - Transfer of care checklist used  ASA# 14 - Acute Post-op Pain: ASA14B - Patient did NOT experience pain >= 7 out of 10

## 2021-06-21 NOTE — PROGRESS NOTES
Subjective:  The patient is cramping in the evening.    OB ROS:   Headache: None.   Vision change: None.   Abnormal vaginal discharge: None.   Bleeding: None.   Fetal movement: Normal.     Objective:  Reviewed vitals. Exam - see flowsheet.   No results found for this or any previous visit (from the past 24 hour(s)).     Assessment/Plan:    33 y.o.  at 39w2d.    No problems.  1. Encounter for supervision of other normal pregnancy in third trimester  Urinalysis, OB Screen (ketones, glucose, protein)      Total time: 15 minutes. Greater than 50% spent in counseling and coordination of care regarding topics, above.

## 2021-06-23 NOTE — PROGRESS NOTES
Subjective     Hla is a 33 y.o. yo  who presents for a postpartum check.    Current Concerns    None. Healing well from tubal ligation.    Contraception Plans  Postpartum Tubal Ligation.    Delivery  The patient delivered at [facility] on 18 via .   Labor was spontaneous and complicated by nothing.     The baby had the following  complications:  jaundice.   Her son, Melany Roman, is currently exclusive breast feeding.     Pregnancy:  Patient Active Problem List    Diagnosis Date Noted     Betel nut use 2018     Priority: Low     Language barrier 2017     Priority: Low     Financial difficulties 2017     Priority: Low     Vitamin D deficiency      Priority: Low     Anemia, unspecified type      Lactating mother 2018       Objective     Blood pressure (!) 84/64, pulse 82, temperature 97.9  F (36.6  C), temperature source Oral, resp. rate 16, weight 127 lb 4 oz (57.7 kg), last menstrual period 2018, unknown if currently breastfeeding. Body mass index is 25.31 kg/m .  Heart: Regular rate and rhythm, no murmurs, rubs, or gallops.  Lungs: Clear to auscultation bilaterally.  No crackles.  Abdomen: Soft, nontender, bowel sounds normal.  No significant uterine tenderness.  Genitourinary: declined.  Extremities: Nontender, no significant edema.    Postpartum Depression Screen EPDS Total Score: 5 (2019  1:42 PM)  .  Item 10 (suicidal thoughts): Negative.  Interpretation Guide: Possible Depression = 10 or greater.       Assessment and Plan     33 y.o.  here today for postpartum check.  1. Postpartum evaluation of pregnancy problems: rubella nonimmune. Got MMR postpartum.  2. Pap smear up to date.  3. Contraception Plan: Postpartum Tubal Ligation.  4. Postpartum evaluation of chronic health conditions: none.  5. Referrals: none.  6. Discussed ideal body weight. Body mass index is 25.31 kg/m . The following high BMI interventions were performed this  visit: encouragement to exercise and lifestyle education regarding diet.  7. Return to work  without restrictions after completion of routine maternity leave.  8. Next physical exam due in one year.    Problem List Items Addressed This Visit     Vitamin D deficiency    Relevant Medications    cholecalciferol, vitamin D3, 4,000 unit cap      Other Visit Diagnoses     Encounter for postpartum visit    -  Primary    Iron deficiency anemia        Relevant Medications    ferrous sulfate 325 (65 FE) MG tablet

## 2021-06-23 NOTE — TELEPHONE ENCOUNTER
Benadryl can cause drowsiness and irritability in infants and is generally contraindicated.  Does she have enough stored breastmilk that she be able to keep giving the infant breastmilk for the next few days?

## 2021-06-23 NOTE — PROGRESS NOTES
"Family Medicine Office Visit  Date of Service: 01/31/2019    Subjective    Hla Jeanette Roman is a 33 y.o. female who presents for Hospital Visit Follow Up (Rash on right are since tuesday. possible allergic reaction to food.)    Itchy rash started yesterday  6 minutes after eating fish eggs and mushroom   Went to [facility] ER: got epi + benadryl. Did not get anything to bring home.  No other allergies. Never had hives before.     Breast feeding. Recent events: no emotional/physical stress, no cold exposure, no recent infections.    Still really itchy. Still has hives. No swelling. No breathing, swallowing, speaking problems. Not taking any medicine right now.     Objective    Blood pressure 98/62, pulse 86, resp. rate 22, height 4' 11\" (1.499 m), weight 125 lb (56.7 kg), currently breastfeeding. Body mass index is 25.25 kg/m . Patient reports that  has never smoked. she has never used smokeless tobacco.    Gen: Alert, no apparent distress.  Heart: Regular rate and rhythm, no murmurs.  Lungs: Clear to auscultation bilaterally, no increased work of breathing.  Abdomen: Soft, non-tender, non-distended, bowel sounds normal.  Extremities: No clubbing, cyanosis, edema.  Skin: scattered urticaria arms, legs, torso. No face swelling. itching off and on.    Results for orders placed or performed during the hospital encounter of 11/17/18   Syphilis Screen, [facility]   Result Value Ref Range    Treponema Antibody (Syphilis) Negative Negative   Hemoglobin   Result Value Ref Range    Hemoglobin 12.9 12.0 - 16.0 g/dL   Hemoglobin   Result Value Ref Range    Hemoglobin 10.6 (L) 12.0 - 16.0 g/dL   Hemoglobin   Result Value Ref Range    Hemoglobin 9.8 (L) 12.0 - 16.0 g/dL   Surgical Pathology Exam   Result Value Ref Range    Case Report       Surgical Pathology                                Case: [id number]                                    Authorizing Provider:  Paras Vitale MD        Collected:           11/18/2018 0917            "   Ordering Location:     [facility] OR     Received:            11/19/2018 0729              Pathologist:           Alejandra Almaraz MD                                                          Specimens:   A) - Fallopian Tube, Right, Portion of right fallopian tube                                         B) - Fallopian Tube, Left, Portion of left fallopian tube                                  Final Diagnosis       A&B) PORTIONS OF RIGHT AND LEFT FALLOPIAN TUBES, POSTPARTUM BILATERAL TUBAL LIGATION:            -  SEGMENTS OF HISTOLOGICALLY UNREMARKABLE FALLOPIAN TUBES (X2);                  FULL THICKNESS CROSS-SECTIONS DEMONSTRATED    Microscopic Description       Microscopic examination performed, substantiating the above diagnosis.    Clinical Information       Pre-op Diagnosis:  Encounter for sterilization [Z30.2]    Gross Description       A) The specimen is received in formalin and is identified as portion of right fallopian tube. It consists of a 1.3 cm length by 0.6 cm diameter tubular segment of gray-purple tissue. The lumen is unremarkable on cut section. T&TE-1C    B) The specimen is received in formalin and is identified as portion of left fallopian tube. It consists of a 0.7 cm length by 0.7 cm diameter tubular segment of gray-purple tissue. The lumen is unremarkable on cut section. B&TE-1C [facility]    Charges CPT: 88302 x2  ICD-10: Z98.51     Result Flag  Normal     No results found.    Assessment & Plan    1. Urticaria, idiopathic. Not obviously allergic. Treat with prednisone for 5 days and loratadine. Breast feeding. If symptoms persist or recur, will refer to allergist.     Problem List Items Addressed This Visit     None      Visit Diagnoses     Urticaria    -  Primary    Relevant Medications    predniSONE (DELTASONE) 10 mg tablet    loratadine (CLARITIN) 10 mg tablet         No future appointments.   Completed by:   Eleanor Steinberg M.D.  [facility]  2/1/2019 10:16 AM  This  transcription uses voice recognition software, which may contain typographical errors.

## 2021-06-23 NOTE — TELEPHONE ENCOUNTER
rigoberto calling for the pt  Rash all over the body and is itching  Asking about breastfeeding baby with the rash and taking benadryl-  Last took benadryl at 0600  The rash is raised up  Started yesterday   Pt started fish egg or mushroom and that may be reason for the rash/hives  Pt was seen in ER at [facility] for hives  It is hard to hear pt during the call     Can she breast feed with taking benadryl?    Call back  HLA  Denice Rosenberg RN [facility] RN Triage      Reason for Disposition    MODERATE-SEVERE hives persist (i.e., hives interfere with normal activities or work) and taking antihistamine (e.g., Benadryl, Claritin) > 24 hours    Protocols used: HIVES-A-OH

## 2021-07-14 PROBLEM — Z34.90 PREGNANT: Status: RESOLVED | Noted: 2018-11-17 | Resolved: 2018-11-18

## 2021-07-14 PROBLEM — O99.119: Status: RESOLVED | Noted: 2017-07-20 | Resolved: 2018-11-26

## 2021-07-14 PROBLEM — D68.62: Status: RESOLVED | Noted: 2017-07-20 | Resolved: 2018-11-26

## 2022-01-19 NOTE — PROGRESS NOTES
Subjective:    Abhinav Reid is a 32 y.o. female who presents for evaluation of sore throat.  She has had a sore throat for 3 days.  She has had a fever off and on as well.  Fever gets better if she takes Tylenol or ibuprofen.  It hurts to swallow.  No known sick contacts.  No coughing.    Patient Active Problem List   Diagnosis     Vitamin D Deficiency     Seborrhea Capitis     Lightheadedness     Anemia     Contraception management - NuvaRing       Current Outpatient Prescriptions:      ferrous sulfate 325 mg (65 mg iron) CpER, Take 1 tablet by mouth 3 (three) times a day., Disp: 100 capsule, Rfl: 2     Objective:   Allergies:  Review of patient's allergies indicates no known allergies.    Vitals:  Vitals:    04/21/17 1029   BP: 100/60   Pulse: 96   Temp: 99.3  F (37.4  C)     Body mass index is 25.45 kg/(m^2).    Vital signs reviewed.  General: Patient is alert and oriented x 3, in no apparent distress  Ears: TMs are non-erythematous with good light reflex bilaterally  Throat: no erythema, edema or exudate noted  Lymphatic: no anterior cervical lymph node enlargement  Cardiac: regular rate and rhythm, no murmurs  Pulmonary: lungs clear to auscultation bilaterally, no crackles, rales, rhonchi, or wheezing noted    Recent Results (from the past 168 hour(s))   Pregnancy, Urine   Result Value Ref Range    Pregnancy Test, Urine Negative Negative    Specific Gravity, UA 1.025 1.001 - 1.030   Rapid Strep A Screen-Throat   Result Value Ref Range    Rapid Strep A Antigen No Group A Strep detected No Group A Strep detected     Culture pending.    Assessment and Plan:   1.  Pharyngitis, likely viral.  Strep culture pending.  I reviewed symptomatic treatment with patient.  Follow-up in 1 week if no better, sooner if worsening.    2.  Contraception management.  Patient says she does not like NuvaRing.  Even though she uses it as directed, she still gets her period a few times a month.  She is interested in getting a tubal  ligation.  She has 2 children and does not want any more.  Referral made today to OB/GYN for consult.    This dictation uses voice recognition software, which may contain typographical errors.     .

## 2022-05-19 ENCOUNTER — ALLIED HEALTH/NURSE VISIT (OUTPATIENT)
Dept: FAMILY MEDICINE | Facility: CLINIC | Age: 37
End: 2022-05-19
Payer: COMMERCIAL

## 2022-05-19 DIAGNOSIS — Z23 IMMUNIZATION DUE: Primary | ICD-10-CM

## 2022-05-19 PROCEDURE — 0054A COVID-19,PF,PFIZER (12+ YRS): CPT

## 2022-05-19 PROCEDURE — 99207 PR NO CHARGE NURSE ONLY: CPT

## 2022-05-19 PROCEDURE — 91305 COVID-19,PF,PFIZER (12+ YRS): CPT

## 2022-06-09 ENCOUNTER — ALLIED HEALTH/NURSE VISIT (OUTPATIENT)
Dept: FAMILY MEDICINE | Facility: CLINIC | Age: 37
End: 2022-06-09
Payer: COMMERCIAL

## 2022-06-09 DIAGNOSIS — Z23 ENCOUNTER FOR IMMUNIZATION: Primary | ICD-10-CM

## 2022-06-09 PROCEDURE — 99207 PR NO CHARGE NURSE ONLY: CPT

## 2022-06-09 PROCEDURE — 0052A COVID-19,PF,PFIZER (12+ YRS): CPT

## 2022-06-09 PROCEDURE — 91305 COVID-19,PF,PFIZER (12+ YRS): CPT

## 2024-02-22 ENCOUNTER — OFFICE VISIT (OUTPATIENT)
Dept: FAMILY MEDICINE | Facility: CLINIC | Age: 39
End: 2024-02-22
Payer: COMMERCIAL

## 2024-02-22 VITALS
OXYGEN SATURATION: 96 % | HEART RATE: 82 BPM | BODY MASS INDEX: 29.43 KG/M2 | SYSTOLIC BLOOD PRESSURE: 122 MMHG | HEIGHT: 59 IN | DIASTOLIC BLOOD PRESSURE: 81 MMHG | TEMPERATURE: 97.1 F | RESPIRATION RATE: 16 BRPM | WEIGHT: 146 LBS

## 2024-02-22 DIAGNOSIS — E55.9 VITAMIN D DEFICIENCY: ICD-10-CM

## 2024-02-22 DIAGNOSIS — D22.9 ATYPICAL NEVUS: ICD-10-CM

## 2024-02-22 DIAGNOSIS — D64.9 ANEMIA, UNSPECIFIED TYPE: ICD-10-CM

## 2024-02-22 DIAGNOSIS — L70.0 ACNE VULGARIS: ICD-10-CM

## 2024-02-22 DIAGNOSIS — Z00.00 ROUTINE GENERAL MEDICAL EXAMINATION AT A HEALTH CARE FACILITY: Primary | ICD-10-CM

## 2024-02-22 DIAGNOSIS — Z13.6 SCREENING FOR CARDIOVASCULAR CONDITION: ICD-10-CM

## 2024-02-22 DIAGNOSIS — Z11.59 NEED FOR HEPATITIS C SCREENING TEST: ICD-10-CM

## 2024-02-22 DIAGNOSIS — Z12.4 CERVICAL CANCER SCREENING: ICD-10-CM

## 2024-02-22 LAB
CHOLEST SERPL-MCNC: 224 MG/DL
FASTING STATUS PATIENT QL REPORTED: ABNORMAL
FASTING STATUS PATIENT QL REPORTED: NORMAL
GLUCOSE SERPL-MCNC: 97 MG/DL (ref 70–99)
HCV AB SERPL QL IA: NONREACTIVE
HDLC SERPL-MCNC: 51 MG/DL
HGB BLD-MCNC: 11 G/DL (ref 11.7–15.7)
LDLC SERPL CALC-MCNC: 135 MG/DL
NONHDLC SERPL-MCNC: 173 MG/DL
TRIGL SERPL-MCNC: 188 MG/DL
VIT D+METAB SERPL-MCNC: 17 NG/ML (ref 20–50)

## 2024-02-22 PROCEDURE — 82947 ASSAY GLUCOSE BLOOD QUANT: CPT | Performed by: FAMILY MEDICINE

## 2024-02-22 PROCEDURE — 80061 LIPID PANEL: CPT | Performed by: FAMILY MEDICINE

## 2024-02-22 PROCEDURE — 87624 HPV HI-RISK TYP POOLED RSLT: CPT | Performed by: FAMILY MEDICINE

## 2024-02-22 PROCEDURE — 99385 PREV VISIT NEW AGE 18-39: CPT | Mod: 25 | Performed by: FAMILY MEDICINE

## 2024-02-22 PROCEDURE — 36415 COLL VENOUS BLD VENIPUNCTURE: CPT | Performed by: FAMILY MEDICINE

## 2024-02-22 PROCEDURE — 99213 OFFICE O/P EST LOW 20 MIN: CPT | Mod: 25 | Performed by: FAMILY MEDICINE

## 2024-02-22 PROCEDURE — G0145 SCR C/V CYTO,THINLAYER,RESCR: HCPCS | Performed by: FAMILY MEDICINE

## 2024-02-22 PROCEDURE — 82306 VITAMIN D 25 HYDROXY: CPT | Performed by: FAMILY MEDICINE

## 2024-02-22 PROCEDURE — 90471 IMMUNIZATION ADMIN: CPT | Performed by: FAMILY MEDICINE

## 2024-02-22 PROCEDURE — 86803 HEPATITIS C AB TEST: CPT | Performed by: FAMILY MEDICINE

## 2024-02-22 PROCEDURE — 91320 SARSCV2 VAC 30MCG TRS-SUC IM: CPT | Performed by: FAMILY MEDICINE

## 2024-02-22 PROCEDURE — 90686 IIV4 VACC NO PRSV 0.5 ML IM: CPT | Performed by: FAMILY MEDICINE

## 2024-02-22 PROCEDURE — 90480 ADMN SARSCOV2 VAC 1/ONLY CMP: CPT | Performed by: FAMILY MEDICINE

## 2024-02-22 PROCEDURE — 85018 HEMOGLOBIN: CPT | Performed by: FAMILY MEDICINE

## 2024-02-22 RX ORDER — TRETINOIN 0.025 %
CREAM (GRAM) TOPICAL AT BEDTIME
Qty: 45 G | Refills: 0 | Status: SHIPPED | OUTPATIENT
Start: 2024-02-22

## 2024-02-22 SDOH — HEALTH STABILITY: PHYSICAL HEALTH: ON AVERAGE, HOW MANY DAYS PER WEEK DO YOU ENGAGE IN MODERATE TO STRENUOUS EXERCISE (LIKE A BRISK WALK)?: 3 DAYS

## 2024-02-22 ASSESSMENT — PAIN SCALES - GENERAL: PAINLEVEL: NO PAIN (0)

## 2024-02-22 ASSESSMENT — SOCIAL DETERMINANTS OF HEALTH (SDOH): HOW OFTEN DO YOU GET TOGETHER WITH FRIENDS OR RELATIVES?: ONCE A WEEK

## 2024-02-22 NOTE — PROGRESS NOTES
Preventive Care Visit  [facility]  Eleanor Steinberg MD, Family Medicine  2024       SUBJECTIVE:   Abhinav is a 38 year old, presenting for the following:  Physical        2024    10:54 AM   Additional Questions   Roomed by Nancy     Healthy Habits:     Getting at least 3 servings of Calcium per day:  Yes    Bi-annual eye exam:  Yes    Dental care twice a year:  Yes    Sleep apnea or symptoms of sleep apnea:  None    Diet:  Regular (no restrictions)    Frequency of exercise:  6-7 days/week    Duration of exercise:  30-45 minutes    Taking medications regularly:  No    Barriers to taking medications:  Not applicable    Medication side effects:  Not applicable    Additional concerns today:  No      Today's PHQ-2 Score:       2024    10:44 AM   PHQ-2 (  [employer])   Q1: Little interest or pleasure in doing things 0   Q2: Feeling down, depressed or hopeless 0   PHQ-2 Score 0   Q1: Little interest or pleasure in doing things Not at all   Q2: Feeling down, depressed or hopeless Not at all   PHQ-2 Score 0     Have you ever done Advance Care Planning? (For example, a Health Directive, POLST, or a discussion with a medical provider or your loved ones about your wishes): No, advance care planning information given to patient to review.  Patient plans to discuss their wishes with loved ones or provider.      Social History     Tobacco Use    Smoking status: Never     Passive exposure: Never    Smokeless tobacco: Never   Substance Use Topics    Alcohol use: No             2024    10:43 AM   Alcohol Use   Prescreen: >3 drinks/day or >7 drinks/week? No     Reviewed orders with patient.  Reviewed health maintenance and updated orders accordingly - Yes      Breast Cancer Screenin/22/2024    10:44 AM   Breast CA Risk Assessment (FHS-7)   Do you have a family history of breast, colon, or ovarian cancer? No / Unknown           Pertinent mammograms are reviewed under the imaging  "tab.    History of abnormal Pap smear: NO - age 30-65 PAP every 5 years with negative HPV co-testing recommended      Latest Ref Rng & Units 2/22/2024    11:08 AM 9/26/2017     5:19 PM   PAP / HPV   PAP  Negative for Intraepithelial Lesion or Malignancy (NILM)  Negative for squamous intraepithelial lesion or malignancy  Electronically signed by Jim Medeiros CT (ASCP) on 10/3/2017 at  1:46 PM      HPV 16 DNA Negative Negative     HPV 18 DNA Negative Negative     Other HR HPV Negative Negative       Reviewed and updated as needed this visit by clinical staff   Tobacco  Allergies  Meds  Problems  Med Hx  Surg Hx  Fam Hx          Reviewed and updated as needed this visit by Provider   Tobacco  Allergies  Meds  Problems  Med Hx  Surg Hx  Fam Hx     Sexual Activity          Review of Systems      OBJECTIVE:   /81 (BP Location: Left arm, Patient Position: Sitting, Cuff Size: Adult Regular)   Pulse 82   Temp 97.1  F (36.2  C) (Temporal)   Resp 16   Ht 1.51 m (4' 11.45\")   Wt 66.2 kg (146 lb)   LMP 01/26/2024   SpO2 96%   Breastfeeding No   BMI 29.04 kg/m     Estimated body mass index is 29.04 kg/m  as calculated from the following:    Height as of this encounter: 1.51 m (4' 11.45\").    Weight as of this encounter: 66.2 kg (146 lb). Prior to immunization administration, verified patients identity using patient s name and date of birth. Please see Immunization Activity for additional information.     Screening Questionnaire for Adult Immunization    Are you sick today?   No   Do you have allergies to medications, food, a vaccine component or latex?   No   Have you ever had a serious reaction after receiving a vaccination?   No   Do you have a long-term health problem with heart, lung, kidney, or metabolic disease (e.g., diabetes), asthma, a blood disorder, no spleen, complement component deficiency, a cochlear implant, or a spinal fluid leak?  Are you on long-term aspirin therapy?   No "   Do you have cancer, leukemia, HIV/AIDS, or any other immune system problem?   No   Do you have a parent, brother, or sister with an immune system problem?   No   In the past 3 months, have you taken medications that affect  your immune system, such as prednisone, other steroids, or anticancer drugs; drugs for the treatment of rheumatoid arthritis, Crohn s disease, or psoriasis; or have you had radiation treatments?   No   Have you had a seizure, or a brain or other nervous system problem?   No   During the past year, have you received a transfusion of blood or blood    products, or been given immune (gamma) globulin or antiviral drug?   No   For women: Are you pregnant or is there a chance you could become       pregnant during the next month?   No   Have you received any vaccinations in the past 4 weeks?   No     Immunization questionnaire answers were all negative.         Screening performed by Debbie Martinez MA on 2/22/2024 at 10:58 AM.       Physical Exam  GENERAL: alert and no distress  EYES: Eyes grossly normal to inspection, PERRL and conjunctivae and sclerae normal  HENT: ear canals and TM's normal, nose and mouth without ulcers or lesions  NECK: no adenopathy, no asymmetry, masses, or scars  RESP: lungs clear to auscultation - no rales, rhonchi or wheezes  CV: regular rate and rhythm, normal S1 S2, no S3 or S4, no murmur, click or rub, no peripheral edema  ABDOMEN: soft, nontender, no hepatosplenomegaly, no masses and bowel sounds normal  MS: no gross musculoskeletal defects noted, no edema  SKIN: no suspicious lesions or rashes  NEURO: Normal strength and tone, mentation intact and speech normal  PSYCH: mentation appears normal, affect normal/bright    Diagnostic Test Results:  Labs reviewed in Epic    ASSESSMENT/PLAN:   (Z00.00) Routine general medical examination at a health care facility  (primary encounter diagnosis)  Comment:     (Z11.59) Need for hepatitis C screening test  Comment:   Plan:  "Hepatitis C Screen Reflex to HCV RNA Quant and         Genotype    (Z12.4) Cervical cancer screening  Comment:   Plan: Pap Screen with HPV - recommended age 30 - 65         years, HPV Hold (Lab Only), HPV High Risk Types        DNA Cervical    (Z13.6) Screening for cardiovascular condition  Comment:   Plan: Glucose, Lipid panel reflex to direct LDL         Fasting    (E55.9) Vitamin D deficiency  Comment:   Plan: Vitamin D deficiency screening, Multiple         Vitamins/Iron TABS    (D64.9) Anemia, unspecified type  Comment:   Plan: Hemoglobin, Multiple Vitamins/Iron TABS    (L70.0) Acne vulgaris  Comment:   Plan: RETIN-A 0.025 % external cream    (D22.9) Atypical nevus  Comment  Plan: Adult Dermatology  Referral         Counseling  Reviewed preventive health counseling, as reflected in patient instructions      BMI  Estimated body mass index is 29.04 kg/m  as calculated from the following:    Height as of this encounter: 1.51 m (4' 11.45\").    Weight as of this encounter: 66.2 kg (146 lb).   Weight management plan: Discussed healthy diet and exercise guidelines      She reports that she has never smoked. She has never been exposed to tobacco smoke. She has never used smokeless tobacco.          Signed Electronically by: Eleanor Steinberg MD  "

## 2024-02-22 NOTE — PATIENT INSTRUCTIONS
Preventive Care Advice   This is general advice given by our system to help you stay healthy. However, your care team may have specific advice just for you. Please talk to your care team about your preventive care needs.  Nutrition  Eat 5 or more servings of fruits and vegetables each day.  Try wheat bread, brown rice and whole grain pasta (instead of white bread, rice, and pasta).  Get enough calcium and vitamin D. Check the label on foods and aim for 100% of the RDA (recommended daily allowance).  Lifestyle  Exercise at least 150 minutes each week  (30 minutes a day, 5 days a week).  Do muscle strengthening activities 2 days a week. These help control your weight and prevent disease.  No smoking.  Wear sunscreen to prevent skin cancer.  Have a dental exam and cleaning every 6 months.  Yearly exams  See your health care team every year to talk about:  Any changes in your health.  Any medicines your care team has prescribed.  Preventive care, family planning, and ways to prevent chronic diseases.  Shots (vaccines)   HPV shots (up to age 26), if you've never had them before.  Hepatitis B shots (up to age 59), if you've never had them before.  COVID-19 shot: Get this shot when it's due.  Flu shot: Get a flu shot every year.  Tetanus shot: Get a tetanus shot every 10 years.  Pneumococcal, hepatitis A, and RSV shots: Ask your care team if you need these based on your risk.  Shingles shot (for age 50 and up)  General health tests  Diabetes screening:  Starting at age 35, Get screened for diabetes at least every 3 years.  If you are younger than age 35, ask your care team if you should be screened for diabetes.  Cholesterol test: At age 39, start having a cholesterol test every 5 years, or more often if advised.  Bone density scan (DEXA): At age 50, ask your care team if you should have this scan for osteoporosis (brittle bones).  Hepatitis C: Get tested at least once in your life.  STIs (sexually transmitted  infections)  Before age 24: Ask your care team if you should be screened for STIs.  After age 24: Get screened for STIs if you're at risk. You are at risk for STIs (including HIV) if:  You are sexually active with more than one person.  You don't use condoms every time.  You or a partner was diagnosed with a sexually transmitted infection.  If you are at risk for HIV, ask about PrEP medicine to prevent HIV.  Get tested for HIV at least once in your life, whether you are at risk for HIV or not.  Cancer screening tests  Cervical cancer screening: If you have a cervix, begin getting regular cervical cancer screening tests starting at age 21.  Breast cancer scan (mammogram): If you've ever had breasts, begin having regular mammograms starting at age 40. This is a scan to check for breast cancer.  Colon cancer screening: It is important to start screening for colon cancer at age 45.  Have a colonoscopy test every 10 years (or more often if you're at risk) Or, ask your provider about stool tests like a FIT test every year or Cologuard test every 3 years.  To learn more about your testing options, visit:   https://www.[email or web address]/590938.pdf.  For help making a decision, visit:   https://bit.ly/ik92506.  Prostate cancer screening test: If you have a prostate, ask your care team if a prostate cancer screening test (PSA) at age 55 is right for you.  Lung cancer screening: If you are a current or former smoker ages 50 to 80, ask your care team if ongoing lung cancer screenings are right for you.  For informational purposes only. Not to replace the advice of your health care provider. Copyright   2023 [employer]. All rights reserved. Clinically reviewed by the [facility] Transitions Program. [facility] 823230 - REV 01/24.

## 2024-02-23 PROBLEM — E78.5 HYPERLIPIDEMIA LDL GOAL <130: Status: ACTIVE | Noted: 2024-02-23

## 2024-02-23 RX ORDER — MULTIVITAMIN WITH IRON
1 TABLET ORAL DAILY
Qty: 100 TABLET | Refills: 3 | Status: SHIPPED | OUTPATIENT
Start: 2024-02-23

## 2024-02-27 LAB
BKR LAB AP GYN ADEQUACY: NORMAL
BKR LAB AP GYN INTERPRETATION: NORMAL
BKR LAB AP HPV REFLEX: NORMAL
BKR LAB AP LMP: NORMAL
BKR LAB AP PREVIOUS ABNORMAL: NORMAL
PATH REPORT.COMMENTS IMP SPEC: NORMAL
PATH REPORT.COMMENTS IMP SPEC: NORMAL
PATH REPORT.RELEVANT HX SPEC: NORMAL

## 2024-02-28 LAB
HUMAN PAPILLOMA VIRUS 16 DNA: NEGATIVE
HUMAN PAPILLOMA VIRUS 18 DNA: NEGATIVE
HUMAN PAPILLOMA VIRUS FINAL DIAGNOSIS: NORMAL
HUMAN PAPILLOMA VIRUS OTHER HR: NEGATIVE

## 2024-03-05 ENCOUNTER — TELEPHONE (OUTPATIENT)
Dept: FAMILY MEDICINE | Facility: CLINIC | Age: 39
End: 2024-03-05
Payer: COMMERCIAL

## 2024-03-05 NOTE — TELEPHONE ENCOUNTER
RN made 1st call to pt to relay provider message re: lab results.     No answer. LM on  (via Elizabeth ) with instruction to call back and ask for triage nurse and let staff know this is a return call.    If pt  calls back, please relay provider message below.    Will try again.    Lalita ANDERSON RN  [facility]   [id number]  ----- Message -----  From: Eleanor Steinberg MD  Sent: 2/23/2024   9:30 AM CST  To: [facility] Family Medicine/Ob Support Pool    Please let Hla know:    Your vitamin D level is low and you are still a little anemic. I would like you take a multiple vitamin pill with iron every day to help your body get the vitamins it needs to stay healthy.    You do not have diabetes or hepatitis C.    Your cholesterol is HIGH.  Lowering your cholesterol will help you stay healthy. You don't need medicine yet, but I would recommend that you eat a heart-healthy diet and stay active.     To improve your cholesterol:  EAT LESS red meat, fatty dairy (like whole milk, butter, and ice cream), and fried food.  Limit sugary foods and beverages (like juice, pop and sports drinks).  EAT MORE fruits, vegetables, whole grain (like brown rice, whole wheat bread, and oatmeal), fish and nuts.  Exercise. The goal is 150 minutes of moderate intensity exercise per week.  Don't smoke. Smoking lowers your good cholesterol and increases your risk for heart attack and stroke.  Lose weight. Even losing 10% of your weight will significantly improve your cholesterol.    Let me know if you would like to see a nutritionist to learn more about eating heart-healthy.

## 2024-03-06 NOTE — TELEPHONE ENCOUNTER
Contacted patient using an  and relayed message below.  No further action needed.    Gricelda SHAY [facility]\    ----- Message -----  From: Eleanor Steinberg MD  Sent: 2/23/2024   9:30 AM CST  To: [facility] Family Medicine/Ob Support Pool    Please let Hla know:    Your vitamin D level is low and you are still a little anemic. I would like you take a multiple vitamin pill with iron every day to help your body get the vitamins it needs to stay healthy.    You do not have diabetes or hepatitis C.    Your cholesterol is HIGH.  Lowering your cholesterol will help you stay healthy. You don't need medicine yet, but I would recommend that you eat a heart-healthy diet and stay active.     To improve your cholesterol:  EAT LESS red meat, fatty dairy (like whole milk, butter, and ice cream), and fried food.  Limit sugary foods and beverages (like juice, pop and sports drinks).  EAT MORE fruits, vegetables, whole grain (like brown rice, whole wheat bread, and oatmeal), fish and nuts.  Exercise. The goal is 150 minutes of moderate intensity exercise per week.  Don't smoke. Smoking lowers your good cholesterol and increases your risk for heart attack and stroke.  Lose weight. Even losing 10% of your weight will significantly improve your cholesterol.    Let me know if you would like to see a nutritionist to learn more about eating heart-healthy.

## 2024-03-12 DIAGNOSIS — E55.9 VITAMIN D DEFICIENCY: ICD-10-CM

## 2024-03-12 DIAGNOSIS — D64.9 ANEMIA, UNSPECIFIED TYPE: ICD-10-CM

## 2024-03-12 RX ORDER — MULTIVITAMIN WITH IRON
1 TABLET ORAL DAILY
Qty: 100 TABLET | Refills: 3 | OUTPATIENT
Start: 2024-03-12

## 2024-03-12 NOTE — TELEPHONE ENCOUNTER
Refill Request  Medication name: Pending Prescriptions:                       Disp   Refills    Cholecalciferol 100 MCG (4000 UT) CAPS    100 ca*4            Sig: Take 4,000 Units by mouth daily    Multiple Vitamins/Iron TABS               100 ta*3            Sig: Take 1 tablet by mouth daily    Requested Pharmacy:  [facility] - [place], [place] - [address] [place] [address]     Requesting vitamin D medication be sent to pharmacy

## 2024-03-12 NOTE — TELEPHONE ENCOUNTER
Medication Question or Refill    Contacts         Type Contact Phone/Fax    03/12/2024 01:21 PM CDT Phone (Incoming) Jessie Abhinav Jeanette (Self) [phone] (H)            What medication are you calling about (include dose and sig)?: cholecalciferol, vitamin D3, 4,000 unit cap, Multiple Vitamins/Iron TABS    Preferred Pharmacy:   [address] [address]  Phone: [phone] Fax: [phone]      Controlled Substance Agreement on file:   CSA -- Patient Level:    CSA: None found at the patient level.       Who prescribed the medication?: Dr. Steinberg    Do you need a refill? Yes    When did you use the medication last? N/A    Patient offered an appointment? No    Do you have any questions or concerns?  No      Okay to leave a detailed message?: Yes at Home number on file [phone] (home)

## 2025-01-23 ENCOUNTER — PATIENT OUTREACH (OUTPATIENT)
Dept: CARE COORDINATION | Facility: CLINIC | Age: 40
End: 2025-01-23
Payer: COMMERCIAL